# Patient Record
Sex: MALE | Race: WHITE | HISPANIC OR LATINO | Employment: OTHER | URBAN - METROPOLITAN AREA
[De-identification: names, ages, dates, MRNs, and addresses within clinical notes are randomized per-mention and may not be internally consistent; named-entity substitution may affect disease eponyms.]

---

## 2023-03-31 DIAGNOSIS — M51.36 DISCOGENIC LOW BACK PAIN: ICD-10-CM

## 2023-03-31 DIAGNOSIS — G89.29 OTHER CHRONIC PAIN: ICD-10-CM

## 2023-03-31 PROBLEM — R10.13 EPIGASTRIC PAIN: Status: ACTIVE | Noted: 2023-03-31

## 2023-03-31 PROBLEM — K57.32 DIVERTICULITIS OF COLON: Status: ACTIVE | Noted: 2023-03-31

## 2023-03-31 PROBLEM — M51.37 OTHER INTERVERTEBRAL DISC DEGENERATION, LUMBOSACRAL REGION: Status: ACTIVE | Noted: 2023-03-31

## 2023-03-31 PROBLEM — M47.816 LUMBAR SPONDYLOSIS: Status: ACTIVE | Noted: 2023-03-31

## 2023-03-31 PROBLEM — M51.379 OTHER INTERVERTEBRAL DISC DEGENERATION, LUMBOSACRAL REGION: Status: ACTIVE | Noted: 2023-03-31

## 2023-03-31 PROBLEM — E87.6 HYPOKALEMIA: Status: ACTIVE | Noted: 2023-03-31

## 2023-03-31 PROBLEM — R10.10 PAIN OF UPPER ABDOMEN: Status: ACTIVE | Noted: 2023-03-31

## 2023-03-31 PROBLEM — K21.9 ESOPHAGEAL REFLUX: Status: ACTIVE | Noted: 2023-03-31

## 2023-03-31 PROBLEM — E51.9 THIAMINE DEFICIENCY: Status: ACTIVE | Noted: 2023-03-31

## 2023-03-31 PROBLEM — M19.90 ARTHRITIS: Status: ACTIVE | Noted: 2023-03-31

## 2023-03-31 PROBLEM — E66.811 OBESITY (BMI 30.0-34.9): Status: ACTIVE | Noted: 2023-03-31

## 2023-03-31 PROBLEM — E66.9 OBESITY (BMI 30.0-34.9): Status: ACTIVE | Noted: 2023-03-31

## 2023-03-31 PROBLEM — G47.30 SLEEP APNEA: Status: ACTIVE | Noted: 2023-03-31

## 2023-03-31 PROBLEM — R03.0 ELEVATED BP WITHOUT DIAGNOSIS OF HYPERTENSION: Status: ACTIVE | Noted: 2023-03-31

## 2023-03-31 PROBLEM — M47.816 FACET DEGENERATION OF LUMBAR REGION: Status: ACTIVE | Noted: 2023-03-31

## 2023-03-31 PROBLEM — M51.369 LUMBAR DEGENERATIVE DISC DISEASE: Status: ACTIVE | Noted: 2023-03-31

## 2023-03-31 PROBLEM — Z98.84 S/P GASTRIC BYPASS: Status: ACTIVE | Noted: 2023-03-31

## 2023-03-31 PROBLEM — R73.09 ELEVATED GLUCOSE: Status: ACTIVE | Noted: 2023-03-31

## 2023-03-31 PROBLEM — E83.19 IRON EXCESS: Status: ACTIVE | Noted: 2023-03-31

## 2023-03-31 PROBLEM — R10.11 CHRONIC RIGHT UPPER QUADRANT PAIN: Status: ACTIVE | Noted: 2023-03-31

## 2023-03-31 PROBLEM — M51.360 DISCOGENIC LOW BACK PAIN: Status: ACTIVE | Noted: 2023-03-31

## 2023-03-31 PROBLEM — E88.810 DYSMETABOLIC SYNDROME: Status: ACTIVE | Noted: 2023-03-31

## 2023-03-31 RX ORDER — CYCLOBENZAPRINE HYDROCHLORIDE 7.5 MG/1
7.5 TABLET, FILM COATED ORAL 3 TIMES DAILY PRN
COMMUNITY
End: 2023-10-26 | Stop reason: SDUPTHER

## 2023-03-31 RX ORDER — OMEPRAZOLE 40 MG/1
40 CAPSULE, DELAYED RELEASE ORAL
COMMUNITY
Start: 2022-03-31 | End: 2024-05-31 | Stop reason: DRUGHIGH

## 2023-03-31 RX ORDER — ONDANSETRON 4 MG/1
4 TABLET, ORALLY DISINTEGRATING ORAL EVERY 8 HOURS PRN
COMMUNITY
Start: 2022-05-04 | End: 2023-04-28 | Stop reason: ALTCHOICE

## 2023-03-31 RX ORDER — OXYCODONE AND ACETAMINOPHEN 5; 325 MG/1; MG/1
1-2 TABLET ORAL EVERY 6 HOURS PRN
COMMUNITY
Start: 2020-06-01 | End: 2023-03-31 | Stop reason: SDUPTHER

## 2023-03-31 RX ORDER — METAXALONE 800 MG/1
1 TABLET ORAL 2 TIMES DAILY
COMMUNITY
Start: 2014-11-03 | End: 2023-10-26 | Stop reason: SDUPTHER

## 2023-03-31 RX ORDER — NALOXONE HYDROCHLORIDE 4 MG/.1ML
4 SPRAY NASAL AS NEEDED
COMMUNITY
Start: 2019-07-15 | End: 2024-01-26 | Stop reason: SDUPTHER

## 2023-03-31 RX ORDER — OXYCODONE AND ACETAMINOPHEN 5; 325 MG/1; MG/1
1-2 TABLET ORAL EVERY 6 HOURS PRN
Qty: 40 TABLET | Refills: 0 | Status: SHIPPED | OUTPATIENT
Start: 2023-03-31 | End: 2023-04-28 | Stop reason: SDUPTHER

## 2023-04-28 ENCOUNTER — OFFICE VISIT (OUTPATIENT)
Dept: PRIMARY CARE | Facility: CLINIC | Age: 48
End: 2023-04-28
Payer: COMMERCIAL

## 2023-04-28 VITALS
OXYGEN SATURATION: 98 % | TEMPERATURE: 97.7 F | WEIGHT: 242.8 LBS | DIASTOLIC BLOOD PRESSURE: 88 MMHG | BODY MASS INDEX: 31.19 KG/M2 | HEART RATE: 88 BPM | SYSTOLIC BLOOD PRESSURE: 136 MMHG

## 2023-04-28 DIAGNOSIS — K21.00 GASTROESOPHAGEAL REFLUX DISEASE WITH ESOPHAGITIS WITHOUT HEMORRHAGE: ICD-10-CM

## 2023-04-28 DIAGNOSIS — G89.29 OTHER CHRONIC PAIN: ICD-10-CM

## 2023-04-28 DIAGNOSIS — M47.816 FACET DEGENERATION OF LUMBAR REGION: ICD-10-CM

## 2023-04-28 DIAGNOSIS — M19.90 ARTHRITIS: ICD-10-CM

## 2023-04-28 DIAGNOSIS — I10 PRIMARY HYPERTENSION: ICD-10-CM

## 2023-04-28 DIAGNOSIS — R10.13 EPIGASTRIC PAIN: ICD-10-CM

## 2023-04-28 DIAGNOSIS — M51.36 DISCOGENIC LOW BACK PAIN: Primary | ICD-10-CM

## 2023-04-28 PROBLEM — H69.90 EUSTACHIAN TUBE DYSFUNCTION: Status: ACTIVE | Noted: 2023-04-28

## 2023-04-28 PROBLEM — M10.9 GOUT, ARTHROPATHY: Status: ACTIVE | Noted: 2023-04-28

## 2023-04-28 PROBLEM — R60.9 EDEMA: Status: ACTIVE | Noted: 2023-04-28

## 2023-04-28 PROBLEM — E55.9 VITAMIN D DEFICIENCY: Status: ACTIVE | Noted: 2023-04-28

## 2023-04-28 PROBLEM — L40.9 PSORIASIS: Status: ACTIVE | Noted: 2023-04-28

## 2023-04-28 PROBLEM — D17.9 LIPOMA: Status: ACTIVE | Noted: 2023-04-28

## 2023-04-28 PROCEDURE — 3008F BODY MASS INDEX DOCD: CPT | Performed by: FAMILY MEDICINE

## 2023-04-28 PROCEDURE — 99214 OFFICE O/P EST MOD 30 MIN: CPT | Performed by: FAMILY MEDICINE

## 2023-04-28 PROCEDURE — 1036F TOBACCO NON-USER: CPT | Performed by: FAMILY MEDICINE

## 2023-04-28 PROCEDURE — 3079F DIAST BP 80-89 MM HG: CPT | Performed by: FAMILY MEDICINE

## 2023-04-28 PROCEDURE — 3075F SYST BP GE 130 - 139MM HG: CPT | Performed by: FAMILY MEDICINE

## 2023-04-28 RX ORDER — ESOMEPRAZOLE MAGNESIUM 40 MG/1
80 CAPSULE, DELAYED RELEASE ORAL
COMMUNITY
End: 2023-04-28

## 2023-04-28 RX ORDER — CHLORTHALIDONE 25 MG/1
25 TABLET ORAL
COMMUNITY
Start: 2015-09-30 | End: 2023-04-28 | Stop reason: ALTCHOICE

## 2023-04-28 RX ORDER — LISINOPRIL 20 MG/1
20 TABLET ORAL
Qty: 90 TABLET | Refills: 1 | Status: SHIPPED | OUTPATIENT
Start: 2023-04-28 | End: 2023-07-28 | Stop reason: ALTCHOICE

## 2023-04-28 RX ORDER — FAMOTIDINE 40 MG/1
40 TABLET, FILM COATED ORAL 2 TIMES DAILY
Qty: 180 TABLET | Refills: 3 | Status: SHIPPED | OUTPATIENT
Start: 2023-04-28 | End: 2023-10-26 | Stop reason: SDUPTHER

## 2023-04-28 RX ORDER — OXYCODONE AND ACETAMINOPHEN 5; 325 MG/1; MG/1
1-2 TABLET ORAL EVERY 6 HOURS PRN
Qty: 40 TABLET | Refills: 0 | Status: SHIPPED | OUTPATIENT
Start: 2023-04-28 | End: 2023-06-01 | Stop reason: SDUPTHER

## 2023-04-28 RX ORDER — LISINOPRIL 20 MG/1
20 TABLET ORAL
COMMUNITY
End: 2023-04-28 | Stop reason: SDUPTHER

## 2023-04-28 RX ORDER — ALLOPURINOL 100 MG/1
400 TABLET ORAL DAILY
COMMUNITY
Start: 2023-04-06 | End: 2023-07-28 | Stop reason: SDUPTHER

## 2023-04-28 RX ORDER — PANTOPRAZOLE SODIUM 40 MG/1
40 FOR SUSPENSION ORAL
COMMUNITY
Start: 2021-08-03 | End: 2023-04-28 | Stop reason: ALTCHOICE

## 2023-04-28 ASSESSMENT — LIFESTYLE VARIABLES: TOTAL SCORE: 1

## 2023-04-28 NOTE — ASSESSMENT & PLAN NOTE
Pain stable on current regimen  Refilling medications at current dosage - encouraging continued diet and exercise modifications.  The OARRS website was checked and validated.  I have personally reviewed the OARRS report for this patient.  This report has been scanned into the electronic medical record.  I have considered the risks of abuse,, dependence, addiction and diversion.  I believe that it is clinically appropriate for this patient to be prescribed this medication.  The patient has been told not to increase dose or refill sooner than indicated.  Any variation from this practice will results in my denying further prescriptions and possible discharge from care.

## 2023-04-28 NOTE — PROGRESS NOTES
Subjective   Patient ID: Lito Unger is a 47 y.o. adult who presents for Med Refill.  HPI    LIPOMA EXCISIONS: back excisions with Dr. Garcia  Had hematoma on one that needed to be drained  Still some swelling    CHRONIC LOW BACK PAIN:  IN REVIEW:   Has had chronic low back pain for many years  Had been to orthopedics and multiple rounds of PT as well as chiropractic care    MRI 2015 - herniated disc at L5-S1  Has had multiple courses of formal PT  Did see Dr. Radha Young for pain management options     Did have injections with Dr. 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 I have considered the risks of abuse, dependence, addiction and diversion    Is the patient prescribed a combination of a benzodiazepine and opioid?  No    Last Urine Drug Screen / ordered today: Yes  Recent Results (from the past 48250 hour(s))   OPIATE/OPIOID/BENZO PRESCRIPTION COMPLIANCE    Collection Time: 01/30/23 10:23 AM   Result Value Ref Range    DRUG SCREEN COMMENT URINE SEE BELOW     Creatine, Urine 20.7 mg/dL    Amphetamine Screen, Urine PRESUMPTIVE NEGATIVE NEGATIVE    Barbiturate Screen, Urine PRESUMPTIVE NEGATIVE NEGATIVE    Cannabinoid Screen, Urine PRESUMPTIVE NEGATIVE NEGATIVE    Cocaine Screen, Urine PRESUMPTIVE NEGATIVE NEGATIVE    PCP Screen, Urine PRESUMPTIVE NEGATIVE NEGATIVE    7-Aminoclonazepam <25 Cutoff <25 ng/mL    Alpha-Hydroxyalprazolam <25 Cutoff <25 ng/mL    Alpha-Hydroxymidazolam <25 Cutoff <25 ng/mL    Alprazolam <25 Cutoff <25 ng/mL    Chlordiazepoxide <25  Cutoff <25 ng/mL    Clonazepam <25 Cutoff <25 ng/mL    Diazepam <25 Cutoff <25 ng/mL    Lorazepam <25 Cutoff <25 ng/mL    Midazolam <25 Cutoff <25 ng/mL    Nordiazepam <25 Cutoff <25 ng/mL    Oxazepam <25 Cutoff <25 ng/mL    Temazepam <25 Cutoff <25 ng/mL    Zolpidem <25 Cutoff <25 ng/mL    Zolpidem Metabolite (ZCA) <25 Cutoff <25 ng/mL    6-Acetylmorphine <25 Cutoff <25 ng/mL    Codeine <50 Cutoff <50 ng/mL    Hydrocodone <25 Cutoff <25 ng/mL    Hydromorphone <25 Cutoff <25 ng/mL    Morphine Urine <50 Cutoff <50 ng/mL    Norhydrocodone <25 Cutoff <25 ng/mL    Noroxycodone 170 (A) Cutoff <25 ng/mL    Oxycodone 203 (A) Cutoff <25 ng/mL    Oxymorphone 341 (A) Cutoff <25 ng/mL    Tramadol <50 Cutoff <50 ng/mL    O-Desmethyltramadol <50 Cutoff <50 ng/mL    Fentanyl <2.5 Cutoff<2.5 ng/mL    Norfentanyl <2.5 Cutoff<2.5 ng/mL    METHADONE CONFIRMATION,URINE <25 Cutoff <25 ng/mL    EDDP <25 Cutoff <25 ng/mL     Results are as expected.     Controlled Substance Agreement:  Date of the Last Agreement: 1/30/2023  Reviewed Controlled Substance Agreement including but not limited to the benefits, risks, and alternatives to treatment with a Controlled Substance medication(s).    Opioids:  What is the patient's goal of therapy? MAINTAIN TOLERABLE PAIN LEVELS TO CARRY OUT DAILY ACTIVITIES  Is this being achieved with current treatment? YES    I have calculated the patient's Morphine Dose Equivalent (MED):   I have considered referral to Pain Management and/or a specialist, and do not feel it is necessary at this time.    I feel that it is clinically indicated to continue this current medication regimen after consideration of alternative therapies, and other non-opioid treatment.    Opioid Risk Screening:  Family History of Substance Abuse  Alcohol: 0 (4/28/2023 10:00 AM)  Illegal Drugs: 0 (4/28/2023 10:00 AM)  Prescription Drugs: 0 (4/28/2023 10:00 AM)    Personal History of Substance Abuse  Alcohol: 0 (4/28/2023 10:00 AM)  Illegal  Drugs: 0 (4/28/2023 10:00 AM)  Prescription Drugs: 0 (4/28/2023 10:00 AM)    Patient Age (16-45)  Age (16-45): 1 (4/28/2023 10:00 AM)    History of Preadolescent Sexual Abuse  History of Preadolescent Sexual Abuse: .0 (4/28/2023 10:00 AM)    Psychological Disease  Attention Deficit Disorder, Obsessive Compulsive Disorder, Bipolar, Schizophrenia: 0 (4/28/2023 10:00 AM)  Depression: 0 (4/28/2023 10:00 AM)    Total Score  Total Score: 1 (4/28/2023 10:00 AM)    Total Score Risk Category  TOTAL SCORE CATEGORY: Low Risk (0-3) (4/28/2023 10:00 AM)        Pain Assessment:  Analgesia  What was your pain level on average during the past week?: 6  What was your pain level at its worst during the past week?: 8  What percentage of your pain has been relieved during the past week?: 70 %  Is the amount of pain relief you are now obtaining from your current pain relievers enough to make a real difference in your life?: Y  Query to Clinician: Is the patient's pain relief clinically significant?: Yes    Activities of Daily Living  Physical Functioning: Same  Family Relationships: Same  Social Relationships: Same  Mood: Same  Sleep Patterns: Same  Overall Functioning: Same    Adverse Events  Is patient experiencing any side effects from current pain relievers?: N  Patient's Overall Severity of Side Effects: None      Assessment  Is your overall impression that this patient is benefiting from opioid therapy?: Yes  Specific Analgesic Plan: Continue present regimen        Review of Systems  Review of Systems negative except as noted in HPI and Chief complaint.     Objective               Physical Exam  Constitutional:       General: Lito Unger is not in acute distress.     Appearance: Normal appearance. Lito Unger is not ill-appearing.   HENT:      Head: Normocephalic and atraumatic.   Neck:      Vascular: No carotid bruit.   Cardiovascular:      Rate and Rhythm: Normal rate and regular rhythm.      Pulses: Normal pulses.       Heart sounds: Normal heart sounds. No murmur heard.     No gallop.   Pulmonary:      Effort: Pulmonary effort is normal.      Breath sounds: Normal breath sounds. No wheezing, rhonchi or rales.   Musculoskeletal:      Cervical back: Normal range of motion and neck supple. No rigidity or tenderness.   Lymphadenopathy:      Cervical: No cervical adenopathy.   Skin:     General: Skin is warm and dry.   Neurological:      Mental Status: Lito Unger is alert.   Psychiatric:         Mood and Affect: Mood normal.         Behavior: Behavior normal.       /88 (BP Location: Right arm, Patient Position: Sitting, BP Cuff Size: Adult)   Pulse 88   Temp 36.5 °C (97.7 °F)   Wt 110 kg (242 lb 12.8 oz)   SpO2 98%   BMI 31.19 kg/m²      Assessment/Plan   Problem List Items Addressed This Visit          Nervous    Chronic pain    Relevant Medications    oxyCODONE-acetaminophen (Percocet) 5-325 mg tablet    Epigastric pain       Circulatory    HTN (hypertension)    Relevant Medications    lisinopril 20 mg tablet       Digestive    Esophageal reflux    Relevant Medications    famotidine (Pepcid) 40 mg tablet       Musculoskeletal    Arthritis    Facet degeneration of lumbar region    Relevant Medications    oxyCODONE-acetaminophen (Percocet) 5-325 mg tablet    Discogenic low back pain - Primary     Pain stable on current regimen  Refilling medications at current dosage - encouraging continued diet and exercise modifications.  The OARRS website was checked and validated.  I have personally reviewed the OARRS report for this patient.  This report has been scanned into the electronic medical record.  I have considered the risks of abuse,, dependence, addiction and diversion.  I believe that it is clinically appropriate for this patient to be prescribed this medication.  The patient has been told not to increase dose or refill sooner than indicated.  Any variation from this practice will results in my denying further  prescriptions and possible discharge from care.          Relevant Medications    oxyCODONE-acetaminophen (Percocet) 5-325 mg tablet

## 2023-06-01 DIAGNOSIS — M51.36 DISCOGENIC LOW BACK PAIN: ICD-10-CM

## 2023-06-01 DIAGNOSIS — M47.816 FACET DEGENERATION OF LUMBAR REGION: ICD-10-CM

## 2023-06-01 DIAGNOSIS — G89.29 OTHER CHRONIC PAIN: ICD-10-CM

## 2023-06-01 RX ORDER — OXYCODONE AND ACETAMINOPHEN 5; 325 MG/1; MG/1
1-2 TABLET ORAL EVERY 6 HOURS PRN
Qty: 40 TABLET | Refills: 0 | Status: SHIPPED | OUTPATIENT
Start: 2023-06-01 | End: 2023-06-29 | Stop reason: SDUPTHER

## 2023-06-29 DIAGNOSIS — M51.36 DISCOGENIC LOW BACK PAIN: ICD-10-CM

## 2023-06-29 DIAGNOSIS — G89.29 OTHER CHRONIC PAIN: ICD-10-CM

## 2023-06-29 DIAGNOSIS — M47.816 FACET DEGENERATION OF LUMBAR REGION: ICD-10-CM

## 2023-06-29 RX ORDER — OXYCODONE AND ACETAMINOPHEN 5; 325 MG/1; MG/1
1-2 TABLET ORAL EVERY 6 HOURS PRN
Qty: 40 TABLET | Refills: 0 | Status: SHIPPED | OUTPATIENT
Start: 2023-06-29 | End: 2023-07-28 | Stop reason: SDUPTHER

## 2023-07-28 ENCOUNTER — OFFICE VISIT (OUTPATIENT)
Dept: PRIMARY CARE | Facility: CLINIC | Age: 48
End: 2023-07-28
Payer: COMMERCIAL

## 2023-07-28 VITALS
HEIGHT: 73 IN | DIASTOLIC BLOOD PRESSURE: 78 MMHG | BODY MASS INDEX: 31.28 KG/M2 | WEIGHT: 236 LBS | OXYGEN SATURATION: 97 % | SYSTOLIC BLOOD PRESSURE: 130 MMHG | HEART RATE: 78 BPM

## 2023-07-28 DIAGNOSIS — E66.09 CLASS 1 OBESITY DUE TO EXCESS CALORIES WITH SERIOUS COMORBIDITY AND BODY MASS INDEX (BMI) OF 31.0 TO 31.9 IN ADULT: ICD-10-CM

## 2023-07-28 DIAGNOSIS — K21.9 GASTROESOPHAGEAL REFLUX DISEASE WITHOUT ESOPHAGITIS: ICD-10-CM

## 2023-07-28 DIAGNOSIS — G89.29 OTHER CHRONIC PAIN: Primary | ICD-10-CM

## 2023-07-28 DIAGNOSIS — M51.36 DISCOGENIC LOW BACK PAIN: ICD-10-CM

## 2023-07-28 DIAGNOSIS — M10.9 GOUT, UNSPECIFIED CAUSE, UNSPECIFIED CHRONICITY, UNSPECIFIED SITE: ICD-10-CM

## 2023-07-28 DIAGNOSIS — M47.816 FACET DEGENERATION OF LUMBAR REGION: ICD-10-CM

## 2023-07-28 PROCEDURE — 1036F TOBACCO NON-USER: CPT | Performed by: FAMILY MEDICINE

## 2023-07-28 PROCEDURE — 99213 OFFICE O/P EST LOW 20 MIN: CPT | Performed by: FAMILY MEDICINE

## 2023-07-28 PROCEDURE — 3078F DIAST BP <80 MM HG: CPT | Performed by: FAMILY MEDICINE

## 2023-07-28 PROCEDURE — 3008F BODY MASS INDEX DOCD: CPT | Performed by: FAMILY MEDICINE

## 2023-07-28 PROCEDURE — 3075F SYST BP GE 130 - 139MM HG: CPT | Performed by: FAMILY MEDICINE

## 2023-07-28 RX ORDER — OXYCODONE AND ACETAMINOPHEN 5; 325 MG/1; MG/1
1-2 TABLET ORAL EVERY 6 HOURS PRN
Qty: 40 TABLET | Refills: 0 | Status: SHIPPED | OUTPATIENT
Start: 2023-07-28 | End: 2023-08-28 | Stop reason: SDUPTHER

## 2023-07-28 RX ORDER — ALLOPURINOL 100 MG/1
400 TABLET ORAL DAILY
Qty: 360 TABLET | Refills: 3 | Status: SHIPPED | OUTPATIENT
Start: 2023-07-28 | End: 2023-10-26 | Stop reason: SDUPTHER

## 2023-07-28 ASSESSMENT — LIFESTYLE VARIABLES: TOTAL SCORE: 1

## 2023-07-28 NOTE — PROGRESS NOTES
Subjective   Patient ID: Lito Unger is a 47 y.o. adult who presents for Med Refill.  HPI    OBESITY: weight stable  Still struggles with protein intake -  meat makes him ill still  Using Protein shakes, cheese yogurt  Not able to work out as much recently with back pain worsening    CHRONIC LOW BACK PAIN: taking Percocet 1-2 times daily  Usually taking at night as pain wakes him at night  Wakes 2-3 times per week - usually after increased activity    Still sees chiropractor    Tried Lidocaine patches in the past but had allergic reaction to patch  Tried OTC Aspercreme as compounding pharmacy was too expensive    OARRS:  Jeannie Sanders, DO on 7/28/2023 10:33 AM  I have personally reviewed the OARRS report for Lito Unger. I have considered the risks of abuse, dependence, addiction and diversion    Is the patient prescribed a combination of a benzodiazepine and opioid?  No    Last Urine Drug Screen / ordered today: Yes  Recent Results (from the past 81624 hour(s))   OPIATE/OPIOID/BENZO PRESCRIPTION COMPLIANCE    Collection Time: 01/30/23 10:23 AM   Result Value Ref Range    DRUG SCREEN COMMENT URINE SEE BELOW     Creatine, Urine 20.7 mg/dL    Amphetamine Screen, Urine PRESUMPTIVE NEGATIVE NEGATIVE    Barbiturate Screen, Urine PRESUMPTIVE NEGATIVE NEGATIVE    Cannabinoid Screen, Urine PRESUMPTIVE NEGATIVE NEGATIVE    Cocaine Screen, Urine PRESUMPTIVE NEGATIVE NEGATIVE    PCP Screen, Urine PRESUMPTIVE NEGATIVE NEGATIVE    7-Aminoclonazepam <25 Cutoff <25 ng/mL    Alpha-Hydroxyalprazolam <25 Cutoff <25 ng/mL    Alpha-Hydroxymidazolam <25 Cutoff <25 ng/mL    Alprazolam <25 Cutoff <25 ng/mL    Chlordiazepoxide <25 Cutoff <25 ng/mL    Clonazepam <25 Cutoff <25 ng/mL    Diazepam <25 Cutoff <25 ng/mL    Lorazepam <25 Cutoff <25 ng/mL    Midazolam <25 Cutoff <25 ng/mL    Nordiazepam <25 Cutoff <25 ng/mL    Oxazepam <25 Cutoff <25 ng/mL    Temazepam <25 Cutoff <25 ng/mL    Zolpidem <25 Cutoff <25 ng/mL    Zolpidem  Metabolite (ZCA) <25 Cutoff <25 ng/mL    6-Acetylmorphine <25 Cutoff <25 ng/mL    Codeine <50 Cutoff <50 ng/mL    Hydrocodone <25 Cutoff <25 ng/mL    Hydromorphone <25 Cutoff <25 ng/mL    Morphine Urine <50 Cutoff <50 ng/mL    Norhydrocodone <25 Cutoff <25 ng/mL    Noroxycodone 170 (A) Cutoff <25 ng/mL    Oxycodone 203 (A) Cutoff <25 ng/mL    Oxymorphone 341 (A) Cutoff <25 ng/mL    Tramadol <50 Cutoff <50 ng/mL    O-Desmethyltramadol <50 Cutoff <50 ng/mL    Fentanyl <2.5 Cutoff<2.5 ng/mL    Norfentanyl <2.5 Cutoff<2.5 ng/mL    METHADONE CONFIRMATION,URINE <25 Cutoff <25 ng/mL    EDDP <25 Cutoff <25 ng/mL     Results are as expected.     Controlled Substance Agreement:  Date of the Last Agreement: 1/30/2023  Reviewed Controlled Substance Agreement including but not limited to the benefits, risks, and alternatives to treatment with a Controlled Substance medication(s).    Opioids:  What is the patient's goal of therapy? MAINTAIN TOLERABLE  PAIN LEVELS TO CARRY OUT DAILY ACTIVITY  Is this being achieved with current treatment? YES    I have calculated the patient's Morphine Dose Equivalent (MED):   I have considered referral to Pain Management and/or a specialist, and do not feel it is necessary at this time.    I feel that it is clinically indicated to continue this current medication regimen after consideration of alternative therapies, and other non-opioid treatment.    Opioid Risk Screening:  Family History of Substance Abuse  Alcohol: 0 (7/28/2023 10:00 AM)  Illegal Drugs: 0 (7/28/2023 10:00 AM)  Prescription Drugs: 0 (7/28/2023 10:00 AM)    Personal History of Substance Abuse  Alcohol: 0 (7/28/2023 10:00 AM)  Illegal Drugs: 0 (7/28/2023 10:00 AM)  Prescription Drugs: 0 (7/28/2023 10:00 AM)    Patient Age (16-45)  Age (16-45): 1 (7/28/2023 10:00 AM)    History of Preadolescent Sexual Abuse  History of Preadolescent Sexual Abuse: .0 (7/28/2023 10:00 AM)    Psychological Disease  Attention Deficit Disorder,  Obsessive Compulsive Disorder, Bipolar, Schizophrenia: 0 (7/28/2023 10:00 AM)  Depression: 0 (7/28/2023 10:00 AM)    Total Score  Total Score: 1 (7/28/2023 10:00 AM)    Total Score Risk Category  TOTAL SCORE CATEGORY: Low Risk (0-3) (7/28/2023 10:00 AM)        Pain Assessment:  Analgesia  What was your pain level on average during the past week?: 5  What was your pain level at its worst during the past week?: 7  What percentage of your pain has been relieved during the past week?: 60 %  Is the amount of pain relief you are now obtaining from your current pain relievers enough to make a real difference in your life?: Y  Query to Clinician: Is the patient's pain relief clinically significant?: Yes    Activities of Daily Living  Physical Functioning: Same  Family Relationships: Same  Social Relationships: Same  Mood: Same  Sleep Patterns: Worse  Overall Functioning: Same    Adverse Events  Is patient experiencing any side effects from current pain relievers?: N  Patient's Overall Severity of Side Effects: None      Assessment  Is your overall impression that this patient is benefiting from opioid therapy?: Yes  Specific Analgesic Plan: Continue present regimen        Review of Systems    Review of Systems negative except as noted in HPI and Chief complaint.     Objective               Physical Exam  Constitutional:       General: Lito Unger is not in acute distress.     Appearance: Normal appearance. Lito Unger is not ill-appearing.   HENT:      Head: Normocephalic and atraumatic.   Neck:      Vascular: No carotid bruit.   Cardiovascular:      Rate and Rhythm: Normal rate and regular rhythm.      Pulses: Normal pulses.      Heart sounds: Normal heart sounds. No murmur heard.     No gallop.   Pulmonary:      Effort: Pulmonary effort is normal.      Breath sounds: Normal breath sounds. No wheezing, rhonchi or rales.   Musculoskeletal:      Cervical back: Normal range of motion and neck supple. No rigidity or  "tenderness.   Lymphadenopathy:      Cervical: No cervical adenopathy.   Skin:     General: Skin is warm and dry.   Neurological:      Mental Status: Lito Unger is alert.   Psychiatric:         Mood and Affect: Mood normal.         Behavior: Behavior normal.       /78 (BP Location: Right arm, Patient Position: Sitting, BP Cuff Size: Large adult)   Pulse 78   Ht 1.854 m (6' 1\")   Wt 107 kg (236 lb)   SpO2 97%   BMI 31.14 kg/m²      Assessment/Plan   Problem List Items Addressed This Visit       Chronic pain - Primary    Relevant Medications    oxyCODONE-acetaminophen (Percocet) 5-325 mg tablet    Esophageal reflux    Facet degeneration of lumbar region    Relevant Medications    oxyCODONE-acetaminophen (Percocet) 5-325 mg tablet    Discogenic low back pain    Relevant Medications    oxyCODONE-acetaminophen (Percocet) 5-325 mg tablet     Other Visit Diagnoses       Class 1 obesity due to excess calories with serious comorbidity and body mass index (BMI) of 31.0 to 31.9 in adult        Gout, unspecified cause, unspecified chronicity, unspecified site        Relevant Medications    allopurinol (Zyloprim) 100 mg tablet        The patient received Provided instructions on dietary changes  Provided instructions on exercise  Advised to Increase physical activity because they have an above normal BMI.       FOLLOW UP 3 MONTHS.     "

## 2023-08-28 DIAGNOSIS — G89.29 OTHER CHRONIC PAIN: ICD-10-CM

## 2023-08-28 DIAGNOSIS — M47.816 FACET DEGENERATION OF LUMBAR REGION: ICD-10-CM

## 2023-08-28 DIAGNOSIS — M51.36 DISCOGENIC LOW BACK PAIN: ICD-10-CM

## 2023-08-28 RX ORDER — OXYCODONE AND ACETAMINOPHEN 5; 325 MG/1; MG/1
1-2 TABLET ORAL EVERY 6 HOURS PRN
Qty: 40 TABLET | Refills: 0 | Status: SHIPPED | OUTPATIENT
Start: 2023-08-28 | End: 2023-09-27 | Stop reason: SDUPTHER

## 2023-09-27 DIAGNOSIS — M47.816 FACET DEGENERATION OF LUMBAR REGION: ICD-10-CM

## 2023-09-27 DIAGNOSIS — M51.36 DISCOGENIC LOW BACK PAIN: ICD-10-CM

## 2023-09-27 DIAGNOSIS — G89.29 OTHER CHRONIC PAIN: ICD-10-CM

## 2023-09-27 NOTE — TELEPHONE ENCOUNTER
Patient needs refill of Percocet 5/325 sent to [facility] on [address] Lolita SWAIN 10/26/2023.

## 2023-09-28 RX ORDER — OXYCODONE AND ACETAMINOPHEN 5; 325 MG/1; MG/1
1-2 TABLET ORAL EVERY 6 HOURS PRN
Qty: 40 TABLET | Refills: 0 | Status: SHIPPED | OUTPATIENT
Start: 2023-09-28 | End: 2023-10-26 | Stop reason: SDUPTHER

## 2023-10-26 ENCOUNTER — OFFICE VISIT (OUTPATIENT)
Dept: PRIMARY CARE | Facility: CLINIC | Age: 48
End: 2023-10-26
Payer: COMMERCIAL

## 2023-10-26 VITALS
OXYGEN SATURATION: 98 % | BODY MASS INDEX: 30.48 KG/M2 | HEIGHT: 73 IN | SYSTOLIC BLOOD PRESSURE: 138 MMHG | WEIGHT: 230 LBS | DIASTOLIC BLOOD PRESSURE: 86 MMHG | HEART RATE: 78 BPM

## 2023-10-26 DIAGNOSIS — K21.00 GASTROESOPHAGEAL REFLUX DISEASE WITH ESOPHAGITIS WITHOUT HEMORRHAGE: ICD-10-CM

## 2023-10-26 DIAGNOSIS — M51.36 DISCOGENIC LOW BACK PAIN: ICD-10-CM

## 2023-10-26 DIAGNOSIS — M51.36 LUMBAR DEGENERATIVE DISC DISEASE: Primary | ICD-10-CM

## 2023-10-26 DIAGNOSIS — M47.816 FACET DEGENERATION OF LUMBAR REGION: ICD-10-CM

## 2023-10-26 DIAGNOSIS — M10.9 GOUT, ARTHROPATHY: ICD-10-CM

## 2023-10-26 DIAGNOSIS — M10.9 GOUT, UNSPECIFIED CAUSE, UNSPECIFIED CHRONICITY, UNSPECIFIED SITE: ICD-10-CM

## 2023-10-26 DIAGNOSIS — E66.01 MORBID OBESITY (MULTI): ICD-10-CM

## 2023-10-26 DIAGNOSIS — G89.29 OTHER CHRONIC PAIN: ICD-10-CM

## 2023-10-26 PROBLEM — E66.811 OBESITY (BMI 30.0-34.9): Status: RESOLVED | Noted: 2023-03-31 | Resolved: 2023-10-26

## 2023-10-26 PROBLEM — E66.9 OBESITY (BMI 30.0-34.9): Status: RESOLVED | Noted: 2023-03-31 | Resolved: 2023-10-26

## 2023-10-26 PROCEDURE — 99214 OFFICE O/P EST MOD 30 MIN: CPT | Performed by: FAMILY MEDICINE

## 2023-10-26 PROCEDURE — 1036F TOBACCO NON-USER: CPT | Performed by: FAMILY MEDICINE

## 2023-10-26 PROCEDURE — 3075F SYST BP GE 130 - 139MM HG: CPT | Performed by: FAMILY MEDICINE

## 2023-10-26 PROCEDURE — 3079F DIAST BP 80-89 MM HG: CPT | Performed by: FAMILY MEDICINE

## 2023-10-26 PROCEDURE — 3008F BODY MASS INDEX DOCD: CPT | Performed by: FAMILY MEDICINE

## 2023-10-26 RX ORDER — OXYCODONE AND ACETAMINOPHEN 5; 325 MG/1; MG/1
1-2 TABLET ORAL EVERY 6 HOURS PRN
Qty: 40 TABLET | Refills: 0 | Status: SHIPPED | OUTPATIENT
Start: 2023-10-26 | End: 2023-11-28 | Stop reason: SDUPTHER

## 2023-10-26 RX ORDER — FAMOTIDINE 40 MG/1
40 TABLET, FILM COATED ORAL 2 TIMES DAILY
Qty: 180 TABLET | Refills: 3 | Status: SHIPPED | OUTPATIENT
Start: 2023-10-26 | End: 2024-04-25 | Stop reason: SDUPTHER

## 2023-10-26 RX ORDER — CYCLOBENZAPRINE HYDROCHLORIDE 7.5 MG/1
7.5 TABLET, FILM COATED ORAL 3 TIMES DAILY PRN
Qty: 60 TABLET | Refills: 2 | Status: SHIPPED | OUTPATIENT
Start: 2023-10-26 | End: 2024-01-26 | Stop reason: SDUPTHER

## 2023-10-26 RX ORDER — ALLOPURINOL 100 MG/1
400 TABLET ORAL DAILY
Qty: 360 TABLET | Refills: 3 | Status: SHIPPED | OUTPATIENT
Start: 2023-10-26

## 2023-10-26 RX ORDER — METAXALONE 800 MG/1
800 TABLET ORAL 2 TIMES DAILY
Qty: 60 TABLET | Refills: 3 | Status: SHIPPED | OUTPATIENT
Start: 2023-10-26 | End: 2024-05-31 | Stop reason: SDUPTHER

## 2023-10-26 ASSESSMENT — LIFESTYLE VARIABLES
AUDIT-C TOTAL SCORE: 0
HOW OFTEN DO YOU HAVE SIX OR MORE DRINKS ON ONE OCCASION: NEVER
SKIP TO QUESTIONS 9-10: 1
TOTAL SCORE: 1
HOW MANY STANDARD DRINKS CONTAINING ALCOHOL DO YOU HAVE ON A TYPICAL DAY: PATIENT DOES NOT DRINK
HOW OFTEN DO YOU HAVE A DRINK CONTAINING ALCOHOL: NEVER

## 2023-10-26 NOTE — PROGRESS NOTES
Subjective   Patient ID: Lito Unger is a 48 y.o. adult who presents for Med Refill.  HPI    OBESITY: weight stable, s/p  bariatric procedure 8/21/2018  He is down 6 pounds form last visit  Unable to work out due to work schedule and knee issues - see below    LEFT KNEE INJURY: had injury when someone  backed into area he was unloading and was pinched between things  Seeing   ? Ortho at [facility] has appointment this week for discussion of surgical repair  Meniscal injury that may need repair    CHRONIC LOW BACK PAIN: taking Percocet 1-2 times daily  Usually taking at night as pain wakes him at night  Wakes 2-3 times per week - usually after increased activity    Still sees chiropractor fairly regularly  Using Tylenol more than the pain meds    REVIEW OF TREATMENT COURSE:  Has had x-rays and MRI with bulging discs at L4 - L5 and L5-S1 along with foraminal stenosis    Failed multiple rounds of PT, conservative treatment NSAIDs, Topamax, Gabapentin and multiple forms of muscle relaxers.    Consultation with Dr. Lynne, pain management, for injections - helped minimally for a short term   They had discussed regarding ablation    Tried Lidocaine patches in the past but had allergic reaction to patch  Tried OTC Aspercreme as compounding pharmacy was too expensive    OARRS:  Jeannie Sanders, DO on 10/26/2023 11:16 AM  I have personally reviewed the OARRS report for Lito Unger. I have considered the risks of abuse, dependence, addiction and diversion    Is the patient prescribed a combination of a benzodiazepine and opioid?  No    Last Urine Drug Screen / ordered today: Yes  Recent Results (from the past 8760 hour(s))   OPIATE/OPIOID/BENZO PRESCRIPTION COMPLIANCE    Collection Time: 01/30/23 10:23 AM   Result Value Ref Range    DRUG SCREEN COMMENT URINE SEE BELOW     Creatine, Urine 20.7 mg/dL    Amphetamine Screen, Urine PRESUMPTIVE NEGATIVE NEGATIVE    Barbiturate Screen, Urine PRESUMPTIVE NEGATIVE NEGATIVE     Cannabinoid Screen, Urine PRESUMPTIVE NEGATIVE NEGATIVE    Cocaine Screen, Urine PRESUMPTIVE NEGATIVE NEGATIVE    PCP Screen, Urine PRESUMPTIVE NEGATIVE NEGATIVE    7-Aminoclonazepam <25 Cutoff <25 ng/mL    Alpha-Hydroxyalprazolam <25 Cutoff <25 ng/mL    Alpha-Hydroxymidazolam <25 Cutoff <25 ng/mL    Alprazolam <25 Cutoff <25 ng/mL    Chlordiazepoxide <25 Cutoff <25 ng/mL    Clonazepam <25 Cutoff <25 ng/mL    Diazepam <25 Cutoff <25 ng/mL    Lorazepam <25 Cutoff <25 ng/mL    Midazolam <25 Cutoff <25 ng/mL    Nordiazepam <25 Cutoff <25 ng/mL    Oxazepam <25 Cutoff <25 ng/mL    Temazepam <25 Cutoff <25 ng/mL    Zolpidem <25 Cutoff <25 ng/mL    Zolpidem Metabolite (ZCA) <25 Cutoff <25 ng/mL    6-Acetylmorphine <25 Cutoff <25 ng/mL    Codeine <50 Cutoff <50 ng/mL    Hydrocodone <25 Cutoff <25 ng/mL    Hydromorphone <25 Cutoff <25 ng/mL    Morphine Urine <50 Cutoff <50 ng/mL    Norhydrocodone <25 Cutoff <25 ng/mL    Noroxycodone 170 (A) Cutoff <25 ng/mL    Oxycodone 203 (A) Cutoff <25 ng/mL    Oxymorphone 341 (A) Cutoff <25 ng/mL    Tramadol <50 Cutoff <50 ng/mL    O-Desmethyltramadol <50 Cutoff <50 ng/mL    Fentanyl <2.5 Cutoff<2.5 ng/mL    Norfentanyl <2.5 Cutoff<2.5 ng/mL    METHADONE CONFIRMATION,URINE <25 Cutoff <25 ng/mL    EDDP <25 Cutoff <25 ng/mL     Results are as expected.     Controlled Substance Agreement:  Date of the Last Agreement: 1/30/2023  Reviewed Controlled Substance Agreement including but not limited to the benefits, risks, and alternatives to treatment with a Controlled Substance medication(s).    Opioids:  What is the patient's goal of therapy? Maintain adequate  Is this being achieved with current treatment? yes    I have calculated the patient's Morphine Dose Equivalent (MED):   I have considered referral to Pain Management and/or a specialist, and do not feel it is necessary at this time.    I feel that it is clinically indicated to continue this current medication regimen after consideration  of alternative therapies, and other non-opioid treatment.    Opioid Risk Screening:  Family History of Substance Abuse  Alcohol: 0 (10/26/2023 11:00 AM)  Illegal Drugs: 0 (10/26/2023 11:00 AM)  Prescription Drugs: 0 (10/26/2023 11:00 AM)    Personal History of Substance Abuse  Alcohol: 0 (10/26/2023 11:00 AM)  Illegal Drugs: 0 (10/26/2023 11:00 AM)  Prescription Drugs: 0 (10/26/2023 11:00 AM)    Patient Age (16-45)  Age (16-45): 0 (10/26/2023 11:00 AM)    History of Preadolescent Sexual Abuse  History of Preadolescent Sexual Abuse: .0 (10/26/2023 11:00 AM)    Psychological Disease  Attention Deficit Disorder, Obsessive Compulsive Disorder, Bipolar, Schizophrenia: 0 (10/26/2023 11:00 AM)  Depression: 1 (10/26/2023 11:00 AM)    Total Score  Total Score: 1 (10/26/2023 11:00 AM)    Total Score Risk Category  TOTAL SCORE CATEGORY: Low Risk (0-3) (10/26/2023 11:00 AM)        Pain Assessment:  Analgesia  What was your pain level on average during the past week?: 7  What was your pain level at its worst during the past week?: 9  What percentage of your pain has been relieved during the past week?: 60 %  Is the amount of pain relief you are now obtaining from your current pain relievers enough to make a real difference in your life?: Y  Query to Clinician: Is the patient's pain relief clinically significant?: Yes    Activities of Daily Living  Physical Functioning: Worse  Family Relationships: Better  Social Relationships: Same  Mood: Same  Sleep Patterns: Same  Overall Functioning: Same    Adverse Events  Is patient experiencing any side effects from current pain relievers?: N  Patient's Overall Severity of Side Effects: None      Assessment  Is your overall impression that this patient is benefiting from opioid therapy?: Yes  Specific Analgesic Plan: Continue present regimen          Review of Systems    Review of Systems negative except as noted in HPI and Chief complaint.     Objective               Physical  "Exam  Constitutional:       General: Lito Unger is not in acute distress.     Appearance: Normal appearance. Lito Unger is not ill-appearing.   HENT:      Head: Normocephalic and atraumatic.   Neck:      Vascular: No carotid bruit.   Cardiovascular:      Rate and Rhythm: Normal rate and regular rhythm.      Pulses: Normal pulses.      Heart sounds: Normal heart sounds. No murmur heard.     No gallop.   Pulmonary:      Effort: Pulmonary effort is normal.      Breath sounds: Normal breath sounds. No wheezing, rhonchi or rales.   Musculoskeletal:      Cervical back: Normal range of motion and neck supple. No rigidity or tenderness.   Lymphadenopathy:      Cervical: No cervical adenopathy.   Skin:     General: Skin is warm and dry.   Neurological:      Mental Status: Lito Unger is alert.   Psychiatric:         Mood and Affect: Mood normal.         Behavior: Behavior normal.       /86 (BP Location: Right arm, Patient Position: Sitting, BP Cuff Size: Large adult)   Pulse 78   Ht 1.854 m (6' 1\")   Wt 104 kg (230 lb)   SpO2 98%   BMI 30.34 kg/m²      Assessment/Plan   Problem List Items Addressed This Visit       Chronic pain    Relevant Medications    oxyCODONE-acetaminophen (Percocet) 5-325 mg tablet    Esophageal reflux     Stable on current PPI.  Continue at current dosage.  Follow up at least annually - soner with any problems or change in symptoms.         Relevant Medications    famotidine (Pepcid) 40 mg tablet    Facet degeneration of lumbar region    Relevant Medications    oxyCODONE-acetaminophen (Percocet) 5-325 mg tablet    Discogenic low back pain     Stable on current regimen - encouraging to follow up with pain management to discuss nerve ablation or other treatment options for chronic pain.         Relevant Medications    metaxalone (Skelaxin) 800 mg tablet    cyclobenzaprine (Fexmid) 7.5 mg tablet    oxyCODONE-acetaminophen (Percocet) 5-325 mg tablet    Lumbar degenerative disc " disease - Primary    Relevant Medications    metaxalone (Skelaxin) 800 mg tablet    cyclobenzaprine (Fexmid) 7.5 mg tablet    Gout, arthropathy     Stable on Allopurinol.  Refilling at current dosage.  Follow up at least annually - sooner with changes or worsening of symptoms.         Morbid obesity (CMS/HCC)     Stable - continues to work at weight loss and to maintain weight loss.            Other Visit Diagnoses       Gout, unspecified cause, unspecified chronicity, unspecified site        Relevant Medications    allopurinol (Zyloprim) 100 mg tablet          FOLLOW UP 3 MONTHS.

## 2023-10-27 NOTE — ASSESSMENT & PLAN NOTE
Stable on Allopurinol.  Refilling at current dosage.  Follow up at least annually - sooner with changes or worsening of symptoms.

## 2023-10-27 NOTE — ASSESSMENT & PLAN NOTE
Stable on current regimen - encouraging to follow up with pain management to discuss nerve ablation or other treatment options for chronic pain.

## 2023-10-27 NOTE — ASSESSMENT & PLAN NOTE
Stable on current PPI.  Continue at current dosage.  Follow up at least annually - soner with any problems or change in symptoms.

## 2023-11-28 DIAGNOSIS — M51.36 DISCOGENIC LOW BACK PAIN: ICD-10-CM

## 2023-11-28 DIAGNOSIS — G89.29 OTHER CHRONIC PAIN: ICD-10-CM

## 2023-11-28 DIAGNOSIS — M47.816 FACET DEGENERATION OF LUMBAR REGION: ICD-10-CM

## 2023-11-28 RX ORDER — OXYCODONE AND ACETAMINOPHEN 5; 325 MG/1; MG/1
1-2 TABLET ORAL EVERY 6 HOURS PRN
Qty: 40 TABLET | Refills: 0 | Status: SHIPPED | OUTPATIENT
Start: 2023-11-28 | End: 2023-12-28 | Stop reason: SDUPTHER

## 2023-11-28 NOTE — TELEPHONE ENCOUNTER
Patient is calling in for a refill on his oxycodone, pended    LAST OV: 10/26/2023  UDS: 01/30/2023

## 2023-12-11 ENCOUNTER — TELEPHONE (OUTPATIENT)
Dept: PRIMARY CARE | Facility: CLINIC | Age: 48
End: 2023-12-11
Payer: COMMERCIAL

## 2023-12-11 NOTE — TELEPHONE ENCOUNTER
Pt called and states he have a problem in his rectum area that has reoccurred. Please contact pt for further details.

## 2023-12-14 ENCOUNTER — OFFICE VISIT (OUTPATIENT)
Dept: PRIMARY CARE | Facility: CLINIC | Age: 48
End: 2023-12-14
Payer: COMMERCIAL

## 2023-12-14 VITALS — HEART RATE: 87 BPM | OXYGEN SATURATION: 98 % | DIASTOLIC BLOOD PRESSURE: 80 MMHG | SYSTOLIC BLOOD PRESSURE: 130 MMHG

## 2023-12-14 DIAGNOSIS — K62.5 RECTAL BLEEDING: ICD-10-CM

## 2023-12-14 DIAGNOSIS — F32.A ANXIETY AND DEPRESSION: ICD-10-CM

## 2023-12-14 DIAGNOSIS — Z12.11 ENCOUNTER FOR SCREENING FOR MALIGNANT NEOPLASM OF COLON: Primary | ICD-10-CM

## 2023-12-14 DIAGNOSIS — F41.9 ANXIETY AND DEPRESSION: ICD-10-CM

## 2023-12-14 PROBLEM — Z86.79 HISTORY OF HYPERTENSION: Status: ACTIVE | Noted: 2023-11-06

## 2023-12-14 PROBLEM — Z86.69 HISTORY OF SLEEP APNEA: Status: ACTIVE | Noted: 2023-11-06

## 2023-12-14 PROBLEM — Z98.84 HISTORY OF BARIATRIC SURGERY: Status: ACTIVE | Noted: 2023-11-06

## 2023-12-14 PROCEDURE — 3008F BODY MASS INDEX DOCD: CPT | Performed by: FAMILY MEDICINE

## 2023-12-14 PROCEDURE — 3075F SYST BP GE 130 - 139MM HG: CPT | Performed by: FAMILY MEDICINE

## 2023-12-14 PROCEDURE — 1036F TOBACCO NON-USER: CPT | Performed by: FAMILY MEDICINE

## 2023-12-14 PROCEDURE — 99214 OFFICE O/P EST MOD 30 MIN: CPT | Performed by: FAMILY MEDICINE

## 2023-12-14 PROCEDURE — 3079F DIAST BP 80-89 MM HG: CPT | Performed by: FAMILY MEDICINE

## 2023-12-14 RX ORDER — HYDROCODONE BITARTRATE AND ACETAMINOPHEN 5; 325 MG/1; MG/1
1 TABLET ORAL EVERY 8 HOURS PRN
COMMUNITY
Start: 2023-11-30 | End: 2024-01-26 | Stop reason: ALTCHOICE

## 2023-12-14 RX ORDER — HYDROCORTISONE ACETATE 25 MG/1
25 SUPPOSITORY RECTAL 2 TIMES DAILY PRN
Qty: 30 SUPPOSITORY | Refills: 5 | Status: SHIPPED | OUTPATIENT
Start: 2023-12-14 | End: 2024-04-10 | Stop reason: ALTCHOICE

## 2023-12-14 ASSESSMENT — ANXIETY QUESTIONNAIRES
1. FEELING NERVOUS, ANXIOUS, OR ON EDGE: SEVERAL DAYS
7. FEELING AFRAID AS IF SOMETHING AWFUL MIGHT HAPPEN: MORE THAN HALF THE DAYS
3. WORRYING TOO MUCH ABOUT DIFFERENT THINGS: MORE THAN HALF THE DAYS
IF YOU CHECKED OFF ANY PROBLEMS ON THIS QUESTIONNAIRE, HOW DIFFICULT HAVE THESE PROBLEMS MADE IT FOR YOU TO DO YOUR WORK, TAKE CARE OF THINGS AT HOME, OR GET ALONG WITH OTHER PEOPLE: SOMEWHAT DIFFICULT
6. BECOMING EASILY ANNOYED OR IRRITABLE: SEVERAL DAYS
2. NOT BEING ABLE TO STOP OR CONTROL WORRYING: MORE THAN HALF THE DAYS
5. BEING SO RESTLESS THAT IT IS HARD TO SIT STILL: NOT AT ALL
GAD7 TOTAL SCORE: 10
4. TROUBLE RELAXING: MORE THAN HALF THE DAYS

## 2023-12-14 ASSESSMENT — PATIENT HEALTH QUESTIONNAIRE - PHQ9
6. FEELING BAD ABOUT YOURSELF - OR THAT YOU ARE A FAILURE OR HAVE LET YOURSELF OR YOUR FAMILY DOWN: NEARLY EVERY DAY
SUM OF ALL RESPONSES TO PHQ QUESTIONS 1-9: 12
2. FEELING DOWN, DEPRESSED OR HOPELESS: SEVERAL DAYS
7. TROUBLE CONCENTRATING ON THINGS, SUCH AS READING THE NEWSPAPER OR WATCHING TELEVISION: SEVERAL DAYS
4. FEELING TIRED OR HAVING LITTLE ENERGY: MORE THAN HALF THE DAYS
9. THOUGHTS THAT YOU WOULD BE BETTER OFF DEAD, OR OF HURTING YOURSELF: NOT AT ALL
3. TROUBLE FALLING OR STAYING ASLEEP OR SLEEPING TOO MUCH: MORE THAN HALF THE DAYS
SUM OF ALL RESPONSES TO PHQ9 QUESTIONS 1 AND 2: 2
5. POOR APPETITE OR OVEREATING: MORE THAN HALF THE DAYS
10. IF YOU CHECKED OFF ANY PROBLEMS, HOW DIFFICULT HAVE THESE PROBLEMS MADE IT FOR YOU TO DO YOUR WORK, TAKE CARE OF THINGS AT HOME, OR GET ALONG WITH OTHER PEOPLE: SOMEWHAT DIFFICULT
1. LITTLE INTEREST OR PLEASURE IN DOING THINGS: SEVERAL DAYS
8. MOVING OR SPEAKING SO SLOWLY THAT OTHER PEOPLE COULD HAVE NOTICED. OR THE OPPOSITE, BEING SO FIGETY OR RESTLESS THAT YOU HAVE BEEN MOVING AROUND A LOT MORE THAN USUAL: NOT AT ALL

## 2023-12-14 ASSESSMENT — ENCOUNTER SYMPTOMS: HEMATOCHEZIA: 1

## 2023-12-14 NOTE — LETTER
To Whom This May Concern:    Lito Unger is a longstanding patient in my practice who is currently suffering from gastrointestinal issues as well as acute exacerbation of anxiety and depression.  These medical issues are affecting his ability to carry out daily activities.  It is my recommendation that he be given time to complete further workup and pursue treatment options for these medical issues.  Please feel free to contact my office with any questions or concerns.      Respectfully,      Jeannie Sanders D.O.  [facility]

## 2023-12-14 NOTE — PROGRESS NOTES
Subjective   Patient ID: Lito Unger is a 48 y.o. adult who presents for Rectal Bleeding (GI issues, on going. ).  Rectal Bleeding        5/2023 had follow up with Dr. Ender VILLEGAS with rectal wall thickening - has not had Gi consultation recently.  Last colonoscopy 2018 with rectal polyps and several were removed - follow up was to be this year (5 year rachael)    Now with with blood noted in his underwear - spotting  Sometimes with BM sometimes not      Review of Systems   Gastrointestinal:  Positive for hematochezia.     LEFT KNEE S/P MENISCAL REPAIR:  11/30/2023 Dr. Tavarez at [facility]  Now with some intermittent burning sensation after he stepped wrong    ANXIETY & DEPRESSION:   January 2024 going to court date and hoping to postpone due to medical issues.  Feeling more anxious and depressed.  Unable to carryout daily activities without significant effort.    He and his wife are looking for counselors and family counseling for their twin daughters.    Review of Systems negative except as noted in HPI and Chief complaint.     Objective     Patient Health Questionnaire-9 Score: 12     YESENIA-7 Total Score: 10       /80 (BP Location: Left arm, Patient Position: Sitting, BP Cuff Size: Large adult)   Pulse 87   SpO2 98%      Physical Exam  Constitutional:       General: Lito Unger is not in acute distress.     Appearance: Normal appearance. Lito Unger is not ill-appearing.   HENT:      Head: Normocephalic and atraumatic.   Neck:      Vascular: No carotid bruit.   Cardiovascular:      Rate and Rhythm: Normal rate and regular rhythm.      Pulses: Normal pulses.      Heart sounds: Normal heart sounds. No murmur heard.     No gallop.   Pulmonary:      Effort: Pulmonary effort is normal.      Breath sounds: Normal breath sounds. No wheezing, rhonchi or rales.   Musculoskeletal:      Cervical back: Normal range of motion and neck supple. No rigidity or tenderness.   Lymphadenopathy:      Cervical: No cervical  adenopathy.   Skin:     General: Skin is warm and dry.   Neurological:      Mental Status: Lito Unger is alert.   Psychiatric:         Mood and Affect: Mood normal.         Behavior: Behavior normal.         Assessment/Plan   Problem List Items Addressed This Visit    None  Visit Diagnoses       Encounter for screening for malignant neoplasm of colon    -  Primary    Relevant Orders    Colonoscopy Screening; Average Risk Patient    Rectal bleeding        Relevant Medications    hydrocortisone (Anusol-HC) 25 mg suppository    Other Relevant Orders    Colonoscopy Screening; Average Risk Patient    Anxiety and depression        Relevant Orders    Referral to Psychology    Referral to [facility]          FOLLOW UP 1 MONTH as SCHEDULED.

## 2023-12-19 DIAGNOSIS — K57.32 DIVERTICULITIS OF COLON: ICD-10-CM

## 2023-12-19 RX ORDER — POLYETHYLENE GLYCOL 3350, SODIUM SULFATE ANHYDROUS, SODIUM BICARBONATE, SODIUM CHLORIDE, POTASSIUM CHLORIDE 236; 22.74; 6.74; 5.86; 2.97 G/4L; G/4L; G/4L; G/4L; G/4L
4000 POWDER, FOR SOLUTION ORAL ONCE
Qty: 4000 ML | Refills: 0 | Status: SHIPPED | OUTPATIENT
Start: 2023-12-19 | End: 2023-12-19

## 2023-12-28 DIAGNOSIS — M47.816 FACET DEGENERATION OF LUMBAR REGION: ICD-10-CM

## 2023-12-28 DIAGNOSIS — M51.36 DISCOGENIC LOW BACK PAIN: ICD-10-CM

## 2023-12-28 DIAGNOSIS — G89.29 OTHER CHRONIC PAIN: ICD-10-CM

## 2023-12-28 RX ORDER — OXYCODONE AND ACETAMINOPHEN 5; 325 MG/1; MG/1
1-2 TABLET ORAL EVERY 6 HOURS PRN
Qty: 40 TABLET | Refills: 0 | Status: SHIPPED | OUTPATIENT
Start: 2023-12-28 | End: 2024-01-26 | Stop reason: SDUPTHER

## 2023-12-28 NOTE — TELEPHONE ENCOUNTER
PATIENT CALLED AND IS LEAVING FOR OUT OF TOWN AT 12PM AND NEEDS HIS REFILL SENT OVER HE HAS TRIED CALLING BUT OUR OFFICE WAS CLOSED    Name: TUNDE MIKE  : [id number]  Medication Name: OXYCODONE  Dose: 5-325MG 1-2  Frequency: PRN EVERY 6 HR   Pharmacy: [facility]  Quantity left: 0  Last appointment: 10.26.23  Next appointment: 24    ONCE SINCE IF YOU COULD LET ME NOW SO I CAN LET HIM KNOW HE WILL CALL THEM SO THEY KNOW TO FILL ASAP

## 2024-01-10 ENCOUNTER — APPOINTMENT (OUTPATIENT)
Dept: GASTROENTEROLOGY | Facility: EXTERNAL LOCATION | Age: 49
End: 2024-01-10
Payer: COMMERCIAL

## 2024-01-26 ENCOUNTER — OFFICE VISIT (OUTPATIENT)
Dept: PRIMARY CARE | Facility: CLINIC | Age: 49
End: 2024-01-26
Payer: COMMERCIAL

## 2024-01-26 VITALS — BODY MASS INDEX: 29.69 KG/M2 | HEART RATE: 86 BPM | OXYGEN SATURATION: 97 % | WEIGHT: 225 LBS

## 2024-01-26 DIAGNOSIS — G89.4 CHRONIC PAIN SYNDROME: ICD-10-CM

## 2024-01-26 DIAGNOSIS — Z79.899 HIGH RISK MEDICATION USE: ICD-10-CM

## 2024-01-26 DIAGNOSIS — J30.89 ALLERGIC RHINITIS DUE TO OTHER ALLERGIC TRIGGER, UNSPECIFIED SEASONALITY: ICD-10-CM

## 2024-01-26 DIAGNOSIS — G89.29 OTHER CHRONIC PAIN: ICD-10-CM

## 2024-01-26 DIAGNOSIS — I10 PRIMARY HYPERTENSION: ICD-10-CM

## 2024-01-26 DIAGNOSIS — M47.816 FACET DEGENERATION OF LUMBAR REGION: ICD-10-CM

## 2024-01-26 DIAGNOSIS — M51.36 LUMBAR DEGENERATIVE DISC DISEASE: Primary | ICD-10-CM

## 2024-01-26 DIAGNOSIS — M51.36 DISCOGENIC LOW BACK PAIN: ICD-10-CM

## 2024-01-26 PROCEDURE — 80361 OPIATES 1 OR MORE: CPT

## 2024-01-26 PROCEDURE — 80307 DRUG TEST PRSMV CHEM ANLYZR: CPT

## 2024-01-26 PROCEDURE — 80365 DRUG SCREENING OXYCODONE: CPT

## 2024-01-26 PROCEDURE — 1036F TOBACCO NON-USER: CPT | Performed by: FAMILY MEDICINE

## 2024-01-26 PROCEDURE — 3008F BODY MASS INDEX DOCD: CPT | Performed by: FAMILY MEDICINE

## 2024-01-26 PROCEDURE — 99214 OFFICE O/P EST MOD 30 MIN: CPT | Performed by: FAMILY MEDICINE

## 2024-01-26 RX ORDER — CYCLOBENZAPRINE HYDROCHLORIDE 7.5 MG/1
7.5 TABLET, FILM COATED ORAL 3 TIMES DAILY PRN
Qty: 60 TABLET | Refills: 2 | Status: SHIPPED | OUTPATIENT
Start: 2024-01-26

## 2024-01-26 RX ORDER — FLUTICASONE PROPIONATE 50 MCG
1 SPRAY, SUSPENSION (ML) NASAL DAILY
Qty: 16 G | Refills: 11 | Status: SHIPPED | OUTPATIENT
Start: 2024-01-26 | End: 2024-04-10 | Stop reason: ALTCHOICE

## 2024-01-26 RX ORDER — LISINOPRIL 10 MG/1
10 TABLET ORAL DAILY
Qty: 30 TABLET | Refills: 1 | Status: SHIPPED | OUTPATIENT
Start: 2024-01-26 | End: 2024-04-10 | Stop reason: ALTCHOICE

## 2024-01-26 RX ORDER — NALOXONE HYDROCHLORIDE 4 MG/.1ML
4 SPRAY NASAL AS NEEDED
Qty: 2 EACH | Refills: 0 | Status: SHIPPED | OUTPATIENT
Start: 2024-01-26

## 2024-01-26 RX ORDER — OXYCODONE AND ACETAMINOPHEN 5; 325 MG/1; MG/1
1-2 TABLET ORAL EVERY 6 HOURS PRN
Qty: 60 TABLET | Refills: 0 | Status: SHIPPED | OUTPATIENT
Start: 2024-01-26 | End: 2024-02-26 | Stop reason: SDUPTHER

## 2024-01-26 NOTE — ASSESSMENT & PLAN NOTE
Resuming Lisinopril 10 mg.  Update with home BP readings in 2 weeks.  Follow up 6 months if BP controlled.

## 2024-01-26 NOTE — PROGRESS NOTES
Subjective   Patient ID: Lito Unger is a 48 y.o. adult who presents for Follow-up.    HPI    STRESS: starting family therapy with his wife and daughters with things at home  They have individual counselors at same site and he is hoping to establish wih someone for himself.    GERD: symptoms better on Omeprazole and Famotidine  Seems to do better with Famotidine but only taking prn  Recently had flare with eating [employer] chocolate eggs  Not waking at night with heartburn sx    GOUT: no recent flares with regular Allopurinol    CHRONIC LOW BACK PAIN:  Pain much worse this week - tried going to chiropractor which helped for a few minutes  Failed NSAIDs due to stomach issues and s/p gastric bipass  Topamax gave him psychiatric symptoms    OARRS:  Jeannie Sanders DO on 1/26/2024 10:16 AM  I have personally reviewed the OARRS report for Lito Unger. I have considered the risks of abuse, dependence, addiction and diversion    Is the patient prescribed a combination of a benzodiazepine and opioid?  No    Last Urine Drug Screen / ordered today: Yes  Recent Results (from the past 8760 hour(s))   Opiate Confirmation, Urine    Collection Time: 01/26/24 10:48 AM   Result Value Ref Range    6-Acetylmorphine <25 <25 ng/mL    Codeine <50 <50 ng/mL    Hydrocodone <25 <25 ng/mL    Hydromorphone <25 <25 ng/mL    Morphine  <50 <50 ng/mL    Norhydrocodone <25 <25 ng/mL    Noroxycodone <25 <25 ng/mL    Oxycodone <25 <25 ng/mL    Oxymorphone <25 <25 ng/mL   Drug Screen, Urine With Reflex to Confirmation    Collection Time: 01/26/24 10:48 AM   Result Value Ref Range    Amphetamine Screen, Urine Presumptive Negative Presumptive Negative    Barbiturate Screen, Urine Presumptive Negative Presumptive Negative    Benzodiazepines Screen, Urine Presumptive Negative Presumptive Negative    Cannabinoid Screen, Urine Presumptive Negative Presumptive Negative    Cocaine Metabolite Screen, Urine Presumptive Negative Presumptive Negative     Fentanyl Screen, Urine Presumptive Negative Presumptive Negative    Opiate Screen, Urine Presumptive Negative Presumptive Negative    Oxycodone Screen, Urine Presumptive Negative Presumptive Negative    PCP Screen, Urine Presumptive Negative Presumptive Negative     Results are as expected.   Clinical rationale for not completing a Urine Drug Screen:     Controlled Substance Agreement:  Date of the Last Agreement: 1/26/2024  Reviewed Controlled Substance Agreement including but not limited to the benefits, risks, and alternatives to treatment with a Controlled Substance medication(s).    Opioids:  What is the patient's goal of therapy? Maintain adequate pain levels to carry out daily activities  Is this being achieved with current treatment? yes    I have calculated the patient's Morphine Dose Equivalent (MED):   I have considered referral to Pain Management and/or a specialist, and do not feel it is necessary at this time.    I feel that it is clinically indicated to continue this current medication regimen after consideration of alternative therapies, and other non-opioid treatment.    Opioid Risk Screening:  Family History of Substance Abuse  Alcohol: 0 (10/26/2023 11:00 AM)  Illegal Drugs: 0 (10/26/2023 11:00 AM)  Prescription Drugs: 0 (10/26/2023 11:00 AM)    Personal History of Substance Abuse  Alcohol: 0 (10/26/2023 11:00 AM)  Illegal Drugs: 0 (10/26/2023 11:00 AM)  Prescription Drugs: 0 (10/26/2023 11:00 AM)    Patient Age (16-45)  Age (16-45): 0 (10/26/2023 11:00 AM)    History of Preadolescent Sexual Abuse  History of Preadolescent Sexual Abuse: .0 (10/26/2023 11:00 AM)    Psychological Disease  Attention Deficit Disorder, Obsessive Compulsive Disorder, Bipolar, Schizophrenia: 0 (10/26/2023 11:00 AM)  Depression: 1 (10/26/2023 11:00 AM)    Total Score  Total Score: 1 (10/26/2023 11:00 AM)    Total Score Risk Category  TOTAL SCORE CATEGORY: Low Risk (0-3) (10/26/2023 11:00 AM)        Pain Assessment:  No  data recorded        Review of Systems    Review of Systems negative except as noted in HPI and Chief complaint.     Objective                 Pulse 86   Wt 102 kg (225 lb)   SpO2 97%   BMI 29.69 kg/m²      Physical Exam  Constitutional:       General: Lito Unger is not in acute distress.     Appearance: Normal appearance. Lito Unger is not ill-appearing.   HENT:      Head: Normocephalic and atraumatic.   Neck:      Vascular: No carotid bruit.   Cardiovascular:      Rate and Rhythm: Normal rate and regular rhythm.      Pulses: Normal pulses.      Heart sounds: Normal heart sounds. No murmur heard.     No gallop.   Pulmonary:      Effort: Pulmonary effort is normal.      Breath sounds: Normal breath sounds. No wheezing, rhonchi or rales.   Musculoskeletal:      Cervical back: Normal range of motion and neck supple. No rigidity or tenderness.   Lymphadenopathy:      Cervical: No cervical adenopathy.   Skin:     General: Skin is warm and dry.   Neurological:      Mental Status: Lito Unger is alert.   Psychiatric:         Mood and Affect: Mood normal.         Behavior: Behavior normal.         Assessment/Plan   Problem List Items Addressed This Visit       Chronic pain    Relevant Medications    oxyCODONE-acetaminophen (Percocet) 5-325 mg tablet    Other Relevant Orders    Referral to Pain Medicine    Facet degeneration of lumbar region    Relevant Medications    oxyCODONE-acetaminophen (Percocet) 5-325 mg tablet    Other Relevant Orders    Referral to Pain Medicine    Discogenic low back pain    Relevant Medications    cyclobenzaprine (Fexmid) 7.5 mg tablet    oxyCODONE-acetaminophen (Percocet) 5-325 mg tablet    Other Relevant Orders    Referral to Pain Medicine    Lumbar degenerative disc disease - Primary    Relevant Medications    cyclobenzaprine (Fexmid) 7.5 mg tablet    Other Relevant Orders    Referral to Pain Medicine    Primary hypertension     Resuming Lisinopril 10 mg.  Update with home  BP readings in 2 weeks.  Follow up 6 months if BP controlled.         Relevant Medications    lisinopril 10 mg tablet     Other Visit Diagnoses       Allergic rhinitis due to other allergic trigger, unspecified seasonality        Relevant Medications    fluticasone (Flonase) 50 mcg/actuation nasal spray    High risk medication use        Relevant Medications    naloxone (Narcan) 4 mg/0.1 mL nasal spray    Other Relevant Orders    Drug Screen, Urine With Reflex to Confirmation (Completed)    Opiate Confirmation, Urine (Completed)    OOB Internal Tracking (Completed)          FOLLOW UP 3 MONTHS PER CONTROLLED SUBSTANCE PROTOCOLS, SOONER WITH ANY PROBLEMS OR CONCERNS.

## 2024-01-27 LAB
AMPHETAMINES UR QL SCN: NORMAL
BARBITURATES UR QL SCN: NORMAL
BENZODIAZ UR QL SCN: NORMAL
BZE UR QL SCN: NORMAL
CANNABINOIDS UR QL SCN: NORMAL
FENTANYL+NORFENTANYL UR QL SCN: NORMAL
OPIATES UR QL SCN: NORMAL
OXYCODONE+OXYMORPHONE UR QL SCN: NORMAL
PCP UR QL SCN: NORMAL

## 2024-02-01 LAB
6MAM UR CFM-MCNC: <25 NG/ML
CODEINE UR CFM-MCNC: <50 NG/ML
HYDROCODONE CTO UR CFM-MCNC: <25 NG/ML
HYDROMORPHONE UR CFM-MCNC: <25 NG/ML
MORPHINE UR CFM-MCNC: <50 NG/ML
NORHYDROCODONE UR CFM-MCNC: <25 NG/ML
NOROXYCODONE UR CFM-MCNC: <25 NG/ML
OXYCODONE UR CFM-MCNC: <25 NG/ML
OXYMORPHONE UR CFM-MCNC: <25 NG/ML

## 2024-02-09 ENCOUNTER — APPOINTMENT (OUTPATIENT)
Dept: GASTROENTEROLOGY | Facility: EXTERNAL LOCATION | Age: 49
End: 2024-02-09
Payer: COMMERCIAL

## 2024-02-12 ASSESSMENT — ANXIETY QUESTIONNAIRES
6. BECOMING EASILY ANNOYED OR IRRITABLE: NOT AT ALL
4. TROUBLE RELAXING: NOT AT ALL
5. BEING SO RESTLESS THAT IT IS HARD TO SIT STILL: NOT AT ALL
IF YOU CHECKED OFF ANY PROBLEMS ON THIS QUESTIONNAIRE, HOW DIFFICULT HAVE THESE PROBLEMS MADE IT FOR YOU TO DO YOUR WORK, TAKE CARE OF THINGS AT HOME, OR GET ALONG WITH OTHER PEOPLE: NOT DIFFICULT AT ALL
1. FEELING NERVOUS, ANXIOUS, OR ON EDGE: NOT AT ALL
2. NOT BEING ABLE TO STOP OR CONTROL WORRYING: SEVERAL DAYS
3. WORRYING TOO MUCH ABOUT DIFFERENT THINGS: SEVERAL DAYS
GAD7 TOTAL SCORE: 2
7. FEELING AFRAID AS IF SOMETHING AWFUL MIGHT HAPPEN: NOT AT ALL

## 2024-02-12 ASSESSMENT — PATIENT HEALTH QUESTIONNAIRE - PHQ9
6. FEELING BAD ABOUT YOURSELF - OR THAT YOU ARE A FAILURE OR HAVE LET YOURSELF OR YOUR FAMILY DOWN: SEVERAL DAYS
2. FEELING DOWN, DEPRESSED OR HOPELESS: SEVERAL DAYS
1. LITTLE INTEREST OR PLEASURE IN DOING THINGS: NOT AT ALL
SUM OF ALL RESPONSES TO PHQ9 QUESTIONS 1 AND 2: 1
7. TROUBLE CONCENTRATING ON THINGS, SUCH AS READING THE NEWSPAPER OR WATCHING TELEVISION: NOT AT ALL
SUM OF ALL RESPONSES TO PHQ QUESTIONS 1-9: 6
3. TROUBLE FALLING OR STAYING ASLEEP OR SLEEPING TOO MUCH: NEARLY EVERY DAY
4. FEELING TIRED OR HAVING LITTLE ENERGY: NOT AT ALL
10. IF YOU CHECKED OFF ANY PROBLEMS, HOW DIFFICULT HAVE THESE PROBLEMS MADE IT FOR YOU TO DO YOUR WORK, TAKE CARE OF THINGS AT HOME, OR GET ALONG WITH OTHER PEOPLE: NOT DIFFICULT AT ALL
8. MOVING OR SPEAKING SO SLOWLY THAT OTHER PEOPLE COULD HAVE NOTICED. OR THE OPPOSITE, BEING SO FIGETY OR RESTLESS THAT YOU HAVE BEEN MOVING AROUND A LOT MORE THAN USUAL: NOT AT ALL
5. POOR APPETITE OR OVEREATING: SEVERAL DAYS
9. THOUGHTS THAT YOU WOULD BE BETTER OFF DEAD, OR OF HURTING YOURSELF: NOT AT ALL

## 2024-02-26 ENCOUNTER — TELEPHONE (OUTPATIENT)
Dept: PRIMARY CARE | Facility: CLINIC | Age: 49
End: 2024-02-26
Payer: COMMERCIAL

## 2024-02-26 DIAGNOSIS — M51.36 DISCOGENIC LOW BACK PAIN: ICD-10-CM

## 2024-02-26 DIAGNOSIS — M47.816 FACET DEGENERATION OF LUMBAR REGION: ICD-10-CM

## 2024-02-26 DIAGNOSIS — G89.29 OTHER CHRONIC PAIN: ICD-10-CM

## 2024-02-26 RX ORDER — OXYCODONE AND ACETAMINOPHEN 5; 325 MG/1; MG/1
1-2 TABLET ORAL EVERY 6 HOURS PRN
Qty: 60 TABLET | Refills: 0 | Status: SHIPPED | OUTPATIENT
Start: 2024-02-26 | End: 2024-03-26 | Stop reason: SDUPTHER

## 2024-02-26 NOTE — TELEPHONE ENCOUNTER
Unable to send rx for controlled substances to [facility] - their system must be down again.  I did print rx and will have staff call him to let him know.

## 2024-03-04 ENCOUNTER — HOSPITAL ENCOUNTER (OUTPATIENT)
Dept: RADIOLOGY | Facility: HOSPITAL | Age: 49
Discharge: HOME | End: 2024-03-04
Payer: COMMERCIAL

## 2024-03-04 ENCOUNTER — OFFICE VISIT (OUTPATIENT)
Dept: PRIMARY CARE | Facility: CLINIC | Age: 49
End: 2024-03-04
Payer: COMMERCIAL

## 2024-03-04 ENCOUNTER — LAB (OUTPATIENT)
Dept: LAB | Facility: LAB | Age: 49
End: 2024-03-04
Payer: COMMERCIAL

## 2024-03-04 VITALS
DIASTOLIC BLOOD PRESSURE: 84 MMHG | SYSTOLIC BLOOD PRESSURE: 130 MMHG | TEMPERATURE: 97.7 F | OXYGEN SATURATION: 98 % | HEART RATE: 71 BPM | WEIGHT: 228 LBS | BODY MASS INDEX: 30.08 KG/M2

## 2024-03-04 DIAGNOSIS — R10.84 GENERALIZED ABDOMINAL PAIN: ICD-10-CM

## 2024-03-04 DIAGNOSIS — R10.13 EPIGASTRIC PAIN: ICD-10-CM

## 2024-03-04 DIAGNOSIS — F41.9 ANXIETY AND DEPRESSION: ICD-10-CM

## 2024-03-04 DIAGNOSIS — R10.13 EPIGASTRIC PAIN: Primary | ICD-10-CM

## 2024-03-04 DIAGNOSIS — F32.A ANXIETY AND DEPRESSION: ICD-10-CM

## 2024-03-04 LAB
ALBUMIN SERPL BCP-MCNC: 4.5 G/DL (ref 3.4–5)
ALP SERPL-CCNC: 78 U/L (ref 33–120)
ALT SERPL W P-5'-P-CCNC: 19 U/L (ref 7–52)
ANION GAP SERPL CALC-SCNC: 11 MMOL/L (ref 10–20)
AST SERPL W P-5'-P-CCNC: 23 U/L (ref 9–39)
BILIRUB SERPL-MCNC: 0.6 MG/DL (ref 0–1.2)
BUN SERPL-MCNC: 5 MG/DL (ref 6–23)
CALCIUM SERPL-MCNC: 10.1 MG/DL (ref 8.6–10.6)
CHLORIDE SERPL-SCNC: 103 MMOL/L (ref 98–107)
CO2 SERPL-SCNC: 34 MMOL/L (ref 21–32)
CREAT SERPL-MCNC: 0.83 MG/DL (ref 0.5–1.3)
EGFRCR SERPLBLD CKD-EPI 2021: 88 ML/MIN/1.73M*2
ERYTHROCYTE [DISTWIDTH] IN BLOOD BY AUTOMATED COUNT: 13.1 % (ref 11.5–14.5)
GLUCOSE SERPL-MCNC: 98 MG/DL (ref 74–99)
HCT VFR BLD AUTO: 43.6 % (ref 36–52)
HGB BLD-MCNC: 13.8 G/DL (ref 12–17.5)
MCH RBC QN AUTO: 28.1 PG (ref 26–34)
MCHC RBC AUTO-ENTMCNC: 31.7 G/DL (ref 32–36)
MCV RBC AUTO: 89 FL (ref 80–100)
NRBC BLD-RTO: 0 /100 WBCS (ref 0–0)
PLATELET # BLD AUTO: 332 X10*3/UL (ref 150–450)
POTASSIUM SERPL-SCNC: 4.7 MMOL/L (ref 3.5–5.3)
PROT SERPL-MCNC: 6.9 G/DL (ref 6.4–8.2)
RBC # BLD AUTO: 4.91 X10*6/UL (ref 4–5.9)
SODIUM SERPL-SCNC: 143 MMOL/L (ref 136–145)
WBC # BLD AUTO: 7.1 X10*3/UL (ref 4.4–11.3)

## 2024-03-04 PROCEDURE — 76705 ECHO EXAM OF ABDOMEN: CPT | Performed by: RADIOLOGY

## 2024-03-04 PROCEDURE — 99214 OFFICE O/P EST MOD 30 MIN: CPT | Performed by: FAMILY MEDICINE

## 2024-03-04 PROCEDURE — 3008F BODY MASS INDEX DOCD: CPT | Performed by: FAMILY MEDICINE

## 2024-03-04 PROCEDURE — 1036F TOBACCO NON-USER: CPT | Performed by: FAMILY MEDICINE

## 2024-03-04 PROCEDURE — 80053 COMPREHEN METABOLIC PANEL: CPT

## 2024-03-04 PROCEDURE — 76705 ECHO EXAM OF ABDOMEN: CPT

## 2024-03-04 PROCEDURE — 85027 COMPLETE CBC AUTOMATED: CPT

## 2024-03-04 PROCEDURE — 3079F DIAST BP 80-89 MM HG: CPT | Performed by: FAMILY MEDICINE

## 2024-03-04 PROCEDURE — 3075F SYST BP GE 130 - 139MM HG: CPT | Performed by: FAMILY MEDICINE

## 2024-03-04 PROCEDURE — 36415 COLL VENOUS BLD VENIPUNCTURE: CPT

## 2024-03-04 ASSESSMENT — PAIN SCALES - GENERAL: PAINLEVEL: 4

## 2024-03-04 NOTE — LETTER
To Whom This May Concern:    Lito Unger is a longstanding patient in my practice who is currently suffering from worsening gastrointestinal issues as well as acute exacerbation of anxiety and depression.  He is currently scheduled for procedures starting 3/11/2024 for these issues. It is my recommendation that he be given time to complete further workup and pursue treatment options for these medical issues.  Please feel free to contact my office with any questions or concerns.      Respectfully,      Jeannie Sanders D.O.  [facility]

## 2024-03-04 NOTE — PROGRESS NOTES
Subjective   Patient ID: Lito Unger is a 48 y.o. adult who presents for Abdominal Pain.    HPI    S/p gastric bi pass procedure 2018  Has had issues with meat after surgery  Previously had swallowing test before surgery and slow transit he thought  After surgery was not vomiting but mild nausea persists.    Left sided abdominal pain - worse with eating, does not matter when he eats  Bowel movements darker and having some rectal bleeding as well  Denies dysphagia, a few episodes of vomiting about a week ago    Colonoscopy 3/11/2024 with Dr. Chery    Appetite poor     Review of Systems    Review of Systems negative except as noted in HPI and Chief complaint.     Objective                 /84 (BP Location: Left arm, Patient Position: Sitting, BP Cuff Size: Adult)   Pulse 71   Temp 36.5 °C (97.7 °F) (Temporal)   Wt 103 kg (228 lb)   SpO2 98%   BMI 30.08 kg/m²      Physical Exam  Constitutional:       General: Lito Unger is not in acute distress.     Appearance: Normal appearance. Lito Unger is not ill-appearing.   HENT:      Head: Normocephalic and atraumatic.   Neck:      Vascular: No carotid bruit.   Cardiovascular:      Rate and Rhythm: Normal rate and regular rhythm.      Pulses: Normal pulses.      Heart sounds: Normal heart sounds. No murmur heard.     No gallop.   Pulmonary:      Effort: Pulmonary effort is normal.      Breath sounds: Normal breath sounds. No wheezing, rhonchi or rales.   Abdominal:      General: Bowel sounds are normal.      Palpations: Abdomen is soft.      Comments: Tenderness to deep palpation over epigastric area  Positive Nunez's sign   Musculoskeletal:      Cervical back: Normal range of motion and neck supple. No rigidity or tenderness.   Lymphadenopathy:      Cervical: No cervical adenopathy.   Skin:     General: Skin is warm and dry.   Neurological:      Mental Status: Lito Unger is alert.   Psychiatric:         Mood and Affect: Mood normal.          Behavior: Behavior normal.         Assessment/Plan   Problem List Items Addressed This Visit       Epigastric pain - Primary    Relevant Orders    EGD    CBC (Completed)    Comprehensive metabolic panel (Completed)     Other Visit Diagnoses       Generalized abdominal pain        Relevant Orders    CBC (Completed)    Comprehensive metabolic panel (Completed)    US gallbladder (Completed)    Anxiety and depression        Note of support for legal proceedings.  Consider resuming SSRI or referral to psychology for counseling.  Follow up 3 months.

## 2024-03-06 ENCOUNTER — TELEPHONE (OUTPATIENT)
Dept: GASTROENTEROLOGY | Facility: CLINIC | Age: 49
End: 2024-03-06
Payer: COMMERCIAL

## 2024-03-06 DIAGNOSIS — Z12.11 COLON CANCER SCREENING: Primary | ICD-10-CM

## 2024-03-06 RX ORDER — SODIUM PICOSULFATE, MAGNESIUM OXIDE, AND ANHYDROUS CITRIC ACID 12; 3.5; 1 G/175ML; G/175ML; MG/175ML
LIQUID ORAL
Qty: 350 ML | Refills: 0 | Status: SHIPPED | OUTPATIENT
Start: 2024-03-06 | End: 2024-04-10 | Stop reason: ALTCHOICE

## 2024-03-11 ENCOUNTER — OFFICE VISIT (OUTPATIENT)
Dept: GASTROENTEROLOGY | Facility: EXTERNAL LOCATION | Age: 49
End: 2024-03-11
Payer: COMMERCIAL

## 2024-03-11 DIAGNOSIS — K64.0 FIRST DEGREE HEMORRHOIDS: ICD-10-CM

## 2024-03-11 DIAGNOSIS — Z86.010 PERSONAL HISTORY OF COLONIC POLYPS: ICD-10-CM

## 2024-03-11 DIAGNOSIS — K28.9 GASTROJEJUNAL ULCER: Primary | ICD-10-CM

## 2024-03-11 DIAGNOSIS — Z98.84 BARIATRIC SURGERY STATUS: ICD-10-CM

## 2024-03-11 DIAGNOSIS — Z12.11 ENCOUNTER FOR SCREENING FOR MALIGNANT NEOPLASM OF COLON: ICD-10-CM

## 2024-03-11 DIAGNOSIS — R10.13 EPIGASTRIC PAIN: ICD-10-CM

## 2024-03-11 DIAGNOSIS — K62.5 RECTAL BLEEDING: ICD-10-CM

## 2024-03-11 DIAGNOSIS — K95.89 COMPLICATION OF BARIATRIC PROCEDURE: ICD-10-CM

## 2024-03-11 PROCEDURE — 0753T DGTZ GLS MCRSCP SLD LEVEL IV: CPT

## 2024-03-11 PROCEDURE — 88305 TISSUE EXAM BY PATHOLOGIST: CPT | Performed by: PATHOLOGY

## 2024-03-11 PROCEDURE — 45378 DIAGNOSTIC COLONOSCOPY: CPT | Performed by: INTERNAL MEDICINE

## 2024-03-11 PROCEDURE — 3008F BODY MASS INDEX DOCD: CPT | Performed by: INTERNAL MEDICINE

## 2024-03-11 PROCEDURE — 1036F TOBACCO NON-USER: CPT | Performed by: INTERNAL MEDICINE

## 2024-03-11 PROCEDURE — 43239 EGD BIOPSY SINGLE/MULTIPLE: CPT | Performed by: INTERNAL MEDICINE

## 2024-03-11 NOTE — PROGRESS NOTES
EGD/colonoscopy performed today 3/11/2024 at the [facility] [place] [place]  See [place] report(s) under Media tab.

## 2024-03-13 ENCOUNTER — LAB REQUISITION (OUTPATIENT)
Dept: LAB | Facility: HOSPITAL | Age: 49
End: 2024-03-13
Payer: COMMERCIAL

## 2024-03-19 LAB
LABORATORY COMMENT REPORT: NORMAL
PATH REPORT.FINAL DX SPEC: NORMAL
PATH REPORT.GROSS SPEC: NORMAL
PATH REPORT.RELEVANT HX SPEC: NORMAL
PATH REPORT.TOTAL CANCER: NORMAL

## 2024-03-19 NOTE — PROGRESS NOTES
BARIATRIC SURGERY CLINIC  1 YEAR/ANNUAL FOLLOW UP NOTE    Clinic Date:  3/20/24    Lito Unger, 48 y.o.  MRN: [id number]    Date of Surgery: 2018   Surgical Procedure: Laparoscopic lois en y gastric bypass 08639    Extra Procedures: None      Initial weight: 359 lb  Pre-surgical weight: 317 lb  Today's Visit: 224 lb  Wt Readings from Last 1 Encounters:   03/04/24 103 kg (228 lb)    There is no height or weight on file to calculate BMI.   Total weight loss: 135 lb    Surgeon: Rosy Handley MD      PROVIDER IMPRESSIONS LAST FUV 5/2022 Dr. Handley:  44 yo male post rygbp was having nausea and burning after eating. he was explored and noted to have an internal hernia. Since repair the nausea is better. He only complains of ongoing burning. He is on omeprazole. Notes meat and crackers make it worse. Avoids fish due to gout. We discussed maybe stopping tumeric. He says stopped for a month with no relief. At this point there is no evidence of actual reflux. We discussed he may have functional heartburn. We discussed trying meditation or yoga as well. He notes he also had difficult to control heartburn prior to surgery and was difficult to treat even with high dose ppi's.      We discussed trying adriana and other natual antinausea meds, and eating 3-5 times/day. Also advised GI to check his rectum due to the findings on CT. He notes so hard to find things to eat. Advised to cook meats moist. limited on fish due to his gout. At this time I see no surgical issues.   Chief Complaint     The patient is being seen for follow up. Type of surgery:. Surgery date: .   follow up appt c/o abdominal pain 3/31/22     Provider Impressions 3/11/24 Dr. Chery: EGD/colonoscopy performed today 3/11/2024 at the [facility] ([facility] [place])        Media Information                 Media Information              CT Abdomen/Pelvis 5/2022 Dr. Handley:   FINDINGS:  LOWER CHEST:  Bibasilar atelectasis.     ABDOMEN:      LIVER:  11 mm focal hypoattenuation anteriorly about the falciform ligament  probably focal fatty deposition. 8 mm low-density lesion right  hepatic lobe is too small to characterize.     BILE DUCTS:  No ductal dilatation.     GALLBLADDER:  No calcified gallstones.     SPLEEN:  Within normal limits.     PANCREAS:  Within normal limits.     ADRENAL GLANDS:  Within normal limits.     KIDNEYS AND URETERS:  No definite renal stones or hydronephrosis. Nonspecific perinephric  stranding bilaterally right greater than left.     BOWEL:  Evidence of gastric bypass surgery. There is some fluid in the  excluded stomach. Mildly prominent fluid-filled small bowel loops  with scattered air-fluid levels throughout. There is some probable  postoperative changes about the cecum. Prominent colonic stool. There  is rectal wall thickening.     PERITONEUM/RETROPERITONEUM/LYMPH NODES:  No free air.  No free fluid or focal collection.  Mildly prominent  nonspecific mesenteric nodes..     VASCULAR:  Abdominal aorta is moderately atherosclerotic without aneurysm. IVC  is grossly patent.     PELVIS:     BLADDER:  Within normal limits.     REPRODUCTIVE ORGANS:  Mildly enlarged prostate gland.     ABDOMINAL WALL:  Within normal limits.     BONES:  No suspicious osseous lesions.  Degenerative changes visualized spine.     ADDITIONAL FINDINGS:  Focal density retroareolar left breast probably due to gynecomastia  but otherwise suboptimally med in this examination.     IMPRESSION:  Evidence of gastric bypass surgery. There is some fluid in the  excluded stomach of uncertain significance. Mildly prominent  fluid-filled small bowel loops scattered throughout with some  scattered air-fluid levels could reflect nonspecific ileus/enteritis.  Clinical correlation and follow-up suggested.     Distal colonic/rectal wall thickening with inflammatory or malignant  involvement not excluded and for which close clinical correlation and  follow-up advised.      Hepatic hypoattenuating foci as described about the falciform  ligament which could reflect focal fatty deposition and also too  small to characterize low-density lesion right hepatic lobe. Consider  dedicated ultrasound for further assessment.     Focal density retroareolar region left breast probably due to  gynecomastia. Clinical correlation recommended.     Mildly enlarged prostate gland.     Additional findings as above.    PRESENTING TODAY FOR BARIATRIC POST-OP VISIT:  Visit: 7  years   -  Self Reports Concerns: EGD attempted on 3/11/24 with Dr. 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    Symptoms:    Abdominal pain:  Yes            Constipation: Yes    Diarrhea: No    Dumping Syndrome:  Yes    Experiencing hunger: No    Food Intolerances: No    Nausea/vomiting: Yes- nausea after eating    Reflux: No   Are you on medications: Yes      Diet History:     Carbonated Beverages: No          Caffeinated Beverages: Yes    Time to eat meals: 40 min    Fluid intake: 64 oz/day      Protein Intake:  40-50 grams/day    Breakfast: eggs    Lunch: doesn't eat    Dinner: bite of meat, potatoes, vegetables    Snacks: cheese, chips, nuts    EXERCISE:  No          GERD - Health Related Quality of Life Questionnaire (GERD- HRQL)  On PPI    How bad is the heartburn? 0 = No symptoms  Heartburn when lying down? 0 = No symptoms  Heartburn when standing up? 0 = No symptoms  Heartburn after meals? 0 = No symptoms  Does heartburn change your diet? 0 = No symptoms  Does heartburn wake you from sleep? 0 = No symptoms    Do you have difficulty swallowing? 0 = No symptoms  Do you have pain with swallowing? 0 = No symptoms  If you take medication, does this affect your daily life? 0 = No symptoms    How bad is the regurgitation? 0 = No symptoms  Regurgitation when lying down? 0 = No symptoms  Regurgitation when standing up? 0 = No symptoms  Regurgitation after meals? 0 = No  symptoms  Does regurgitation change your diet? 0 = No symptoms  Does regurgitation wake you from sleep? 0 = No symptoms    How satisfied are you with your present condition? Satisfied    Total score (calculated by summing the individual scores of questions 1-15): 0   Greatest possible score 75 (worst symptoms).   Lowest possible score 0 (no symptoms).    Heartburn score (calculated by summing the individual scores of questions 1-6): 0   Worst heartburn symptoms: 30.   No heartburn symptoms: 0.   Score less than or equal to 12 with each individual question not exceeding 2 indicate heartburn elimination.    Regurgitation score (calculated by summing the individual scores of questions 10-15): 0   Worst regurgitation symptoms: 30.   No regurgitation symptoms: 0.   Score less than or equal to 12 with each individual question not exceeding 2 indicate regurgitation elimination.     Last Visit:    Wt Readings from Last 3 Encounters:   03/04/24 103 kg (228 lb)   01/26/24 102 kg (225 lb)   10/26/23 104 kg (230 lb)        HPI: s/p rygb 2018 with epigastric abd pain and nausea after eating. EGD with Dr. Chery last Monday, unable to be performed d/t to stenosis, has bleeding ulcer.     Current Outpatient Medications   Medication Sig Dispense Refill    allopurinol (Zyloprim) 100 mg tablet Take 4 tablets (400 mg) by mouth once daily. 360 tablet 3    cyclobenzaprine (Fexmid) 7.5 mg tablet Take 1 tablet (7.5 mg) by mouth 3 times a day as needed for muscle spasms. 60 tablet 2    diclofenac sodium 1 % kit Apply 4 g topically every 12 hours if needed (Pain).      famotidine (Pepcid) 40 mg tablet Take 1 tablet (40 mg) by mouth 2 times a day. 180 tablet 3    fluticasone (Flonase) 50 mcg/actuation nasal spray Administer 1 spray into each nostril once daily. Shake gently. Before first use, prime pump. After use, clean tip and replace cap. 16 g 11    hydrocortisone (Anusol-HC) 25 mg suppository Insert 1 suppository (25 mg) into the  rectum 2 times a day as needed for hemorrhoids. 30 suppository 5    lisinopril 10 mg tablet Take 1 tablet (10 mg) by mouth once daily. 30 tablet 1    metaxalone (Skelaxin) 800 mg tablet Take 1 tablet (800 mg) by mouth 2 times a day. 60 tablet 3    naloxone (Narcan) 4 mg/0.1 mL nasal spray Administer 1 spray (4 mg) into affected nostril(s) if needed for opioid reversal. 2 each 0    omeprazole (PriLOSEC) 40 mg DR capsule Take 1 capsule (40 mg) by mouth once daily in the morning. Take before meals.      oxyCODONE-acetaminophen (Percocet) 5-325 mg tablet Take 1-2 tablets by mouth every 6 hours if needed for severe pain (7 - 10). 60 tablet 0    sod picosulf-mag ox-citric ac (Clenpiq) 10 mg-3.5 gram- 12 gram/175 mL solution Take one bottle twice as directed by the prep instructions 350 mL 0     No current facility-administered medications for this visit.       Comorbidities:  No problem-specific Assessment & Plan notes found for this encounter.      DAILY SUPPLEMENTS:  Calcium: Calcium Citrate w/ vitamin D (1200 - 1500mg)  Multivitamin & Minerals: 2 per day  Iron Supplement: included in multi-vitamin  Vitamin B12: 1000 mcg   Vitamin D3: 3000 units      REVIEW OF SYSTEMS:  CONSTITUTIONAL: Patient denies fevers, chills, sweats and weight changes.  EYES: Patient denies any visual symptoms.  EARS, NOSE, AND THROAT: No difficulties with hearing. No symptoms of rhinitis or sore throat.  CARDIOVASCULAR: Patient denies chest pains, palpitations, orthopnea and paroxysmal nocturnal dyspnea.  RESPIRATORY: No dyspnea on exertion, no wheezing or cough.  GI: No nausea, vomiting, diarrhea, constipation, abdominal pain, hematochezia or melena.  : No urinary hesitancy or dribbling. No nocturia or urinary frequency. No abnormal urethral discharge.  MUSCULOSKELETAL: No myalgias or arthralgias.  NEUROLOGIC: No chronic headaches, no seizures. Patient denies numbness, tingling or weakness.  PSYCHIATRIC: Patient denies problems with mood  disturbance. No problems with anxiety.  ENDOCRINE: No excessive urination or excessive thirst.  DERMATOLOGIC: Patient denies any rashes or skin changes.    PHYSICAL EXAM:  There were no vitals taken for this visit. There is no height or weight on file to calculate BMI.  GENERAL: Obese. No apparent distress. Pt is alert and oriented x3.  HEENT: Head is normocephalic and atraumatic. Extraocular muscles are intact. Pupils are equal, round, and reactive to light and accommodation. Nares appeared normal. Mouth is well hydrated and without lesions. Mucous membranes are moist. Posterior pharynx clear of any exudate or lesions.  NECK: Supple. No carotid bruits. No lymphadenopathy or thyromegaly.  LUNGS: Clear to auscultation.  HEART: Regular rate and rhythm without murmur.  ABDOMEN: Soft, nontender, and nondistended. Positive bowel sounds. No hepatosplenomegaly was noted.  EXTREMITIES: Without any cyanosis, clubbing, rash, lesions or edema.  NEUROLOGIC: Cranial nerves II through XII are grossly intact.  PSYCHIATRIC: Flat affect, but denies suicidal or homicidal ideations.  SKIN: No ulceration or induration present.      TODAY'S PROVIDER VISIT IMPRESSIONS:  Patient not seen today    Dr. Rosy Handley M.D., MPH  Director of Bariatric & Minimally Invasive Surgery  [place] [address]  T:  [phone]  F:  [phone]     Shelby Freedman, RN Assistant Nurse Manager, Care Coordinator Patient Nursing Contact  T: [phone]  F: [phone]    Esther Campuzano Patient Navigator- Bariatric Surgery [facility] Patient Clearance Questions & Tracking Contact  T: [phone] F: [phone]

## 2024-03-20 ENCOUNTER — TELEMEDICINE (OUTPATIENT)
Dept: SURGERY | Facility: CLINIC | Age: 49
End: 2024-03-20
Payer: COMMERCIAL

## 2024-03-20 DIAGNOSIS — Z13.21 ENCOUNTER FOR VITAMIN DEFICIENCY SCREENING: ICD-10-CM

## 2024-03-20 DIAGNOSIS — Z98.84 BARIATRIC SURGERY STATUS: ICD-10-CM

## 2024-03-20 DIAGNOSIS — Z98.84 STATUS POST BARIATRIC SURGERY: ICD-10-CM

## 2024-03-20 PROCEDURE — 99212 OFFICE O/P EST SF 10 MIN: CPT | Mod: 95 | Performed by: SURGERY

## 2024-03-20 PROCEDURE — 3008F BODY MASS INDEX DOCD: CPT | Performed by: SURGERY

## 2024-03-20 PROCEDURE — 99212 OFFICE O/P EST SF 10 MIN: CPT | Performed by: SURGERY

## 2024-03-21 NOTE — PROGRESS NOTES
Date of Surgery: 2018   Surgical Procedure: Laparoscopic lois en y gastric bypass 16913  Extra Procedures: None      Initial weight: 359 lb  Pre-surgical weight: 317 lb  Today's Visit: 224 lb      Wt Readings from Last 1 Encounters:   03/04/24 103 kg (228 lb)    There is no height or weight on file to calculate BMI.   Total weight loss: 135 lb     Surgeon: Rosy Handley MD   Today notes a lot of stomach pain.  Had an [facility] endoscopy: GI told patient in the pouch, outlet very tight.  Too much scar.  He saw an ulcer there.  Takes the omeprazole and if gets more heartburn than usual uses pepcid.  Most problem is trouble with eating. Not usung any nsaid's.  Not smoking or vaping.  Went through some time for a year with no heartburn.  Still could not eat.  He feels nausea since the surgery. Pain in stomach.      PROVIDER IMPRESSIONS LAST FUV 5/2022 Dr. Handley:  44 yo male post rygbp was having nausea and burning after eating. he was explored and noted to have an internal hernia. Since repair the nausea is better. He only complains of ongoing burning. He is on omeprazole. Notes meat and crackers make it worse. Avoids fish due to gout. We discussed maybe stopping tumeric. He says stopped for a month with no relief. At this point there is no evidence of actual reflux. We discussed he may have functional heartburn. We discussed trying meditation or yoga as well. He notes he also had difficult to control heartburn prior to surgery and was difficult to treat even with high dose ppi's.      We discussed trying adriana and other natual antinausea meds, and eating 3-5 times/day. Also advised GI to check his rectum due to the findings on CT. He notes so hard to find things to eat. Advised to cook meats moist. limited on fish due to his gout. At this time I see no surgical issues.   Chief Complaint  The patient is being seen for follow up. Type of surgery:. Surgery date: .   follow up appt c/o abdominal pain 3/31/22      Provider  Impressions 3/11/24 Dr. Chery: EGD/colonoscopy performed today 3/11/2024 at the [facility] ([facility] [place])         Media Information                  Media Information              FINAL DIAGNOSIS   A. STOMACH ANTRUM BIOPSY:   Mild reactive gastritis/gastropathy  No H. pylori organisms are identified         CT Abdomen/Pelvis 5/2022 Dr. Handley:   FINDINGS:  LOWER CHEST:  Bibasilar atelectasis.     ABDOMEN:     LIVER:  11 mm focal hypoattenuation anteriorly about the falciform ligament  probably focal fatty deposition. 8 mm low-density lesion right  hepatic lobe is too small to characterize.     BILE DUCTS:  No ductal dilatation.     GALLBLADDER:  No calcified gallstones.     SPLEEN:  Within normal limits.     PANCREAS:  Within normal limits.     ADRENAL GLANDS:  Within normal limits.     KIDNEYS AND URETERS:  No definite renal stones or hydronephrosis. Nonspecific perinephric  stranding bilaterally right greater than left.     BOWEL:  Evidence of gastric bypass surgery. There is some fluid in the  excluded stomach. Mildly prominent fluid-filled small bowel loops  with scattered air-fluid levels throughout. There is some probable  postoperative changes about the cecum. Prominent colonic stool. There  is rectal wall thickening.     PERITONEUM/RETROPERITONEUM/LYMPH NODES:  No free air.  No free fluid or focal collection.  Mildly prominent  nonspecific mesenteric nodes..     VASCULAR:  Abdominal aorta is moderately atherosclerotic without aneurysm. IVC  is grossly patent.     PELVIS:     BLADDER:  Within normal limits.     REPRODUCTIVE ORGANS:  Mildly enlarged prostate gland.     ABDOMINAL WALL:  Within normal limits.     BONES:  No suspicious osseous lesions.  Degenerative changes visualized spine.     ADDITIONAL FINDINGS:  Focal density retroareolar left breast probably due to gynecomastia  but otherwise suboptimally med in this examination.     IMPRESSION:  Evidence of gastric bypass surgery.  There is some fluid in the  excluded stomach of uncertain significance. Mildly prominent  fluid-filled small bowel loops scattered throughout with some  scattered air-fluid levels could reflect nonspecific ileus/enteritis.  Clinical correlation and follow-up suggested.     Distal colonic/rectal wall thickening with inflammatory or malignant  involvement not excluded and for which close clinical correlation and  follow-up advised.     Hepatic hypoattenuating foci as described about the falciform  ligament which could reflect focal fatty deposition and also too  small to characterize low-density lesion right hepatic lobe. Consider  dedicated ultrasound for further assessment.     Focal density retroareolar region left breast probably due to  gynecomastia. Clinical correlation recommended.     Mildly enlarged prostate gland.     Additional findings as above.     PRESENTING TODAY FOR BARIATRIC POST-OP VISIT:  Visit: 7  years   -  Self Reports Concerns: EGD attempted on 3/11/24 with Dr. 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    Symptoms:    Abdominal pain:  Yes            Constipation: Yes    Diarrhea: No    Dumping Syndrome:  Yes    Experiencing hunger: No    Food Intolerances: No    Nausea/vomiting: Yes- nausea after eating    Reflux: No   Are you on medications: Yes                            Diet History:     Carbonated Beverages: No          Caffeinated Beverages: Yes    Time to eat meals: 40 min    Fluid intake: 64 oz/day      Protein Intake:  40-50 grams/day    Breakfast: eggs    Lunch: doesn't eat    Dinner: bite of meat, potatoes, vegetables    Snacks: cheese, chips, nuts     EXERCISE:  No           GERD - Health Related Quality of Life Questionnaire (GERD- HRQL)  On PPI     How bad is the heartburn? 0 = No symptoms  Heartburn when lying down? 0 = No symptoms  Heartburn when standing up? 0 = No symptoms  Heartburn after meals? 0 = No symptoms  Does  heartburn change your diet? 0 = No symptoms  Does heartburn wake you from sleep? 0 = No symptoms     Do you have difficulty swallowing? 0 = No symptoms  Do you have pain with swallowing? 0 = No symptoms  If you take medication, does this affect your daily life? 0 = No symptoms     How bad is the regurgitation? 0 = No symptoms  Regurgitation when lying down? 0 = No symptoms  Regurgitation when standing up? 0 = No symptoms  Regurgitation after meals? 0 = No symptoms  Does regurgitation change your diet? 0 = No symptoms  Does regurgitation wake you from sleep? 0 = No symptoms     How satisfied are you with your present condition? Satisfied     Total score (calculated by summing the individual scores of questions 1-15): 0              Greatest possible score 75 (worst symptoms).              Lowest possible score 0 (no symptoms).     Heartburn score (calculated by summing the individual scores of questions 1-6): 0              Worst heartburn symptoms: 30.              No heartburn symptoms: 0.              Score less than or equal to 12 with each individual question not exceeding 2 indicate heartburn elimination.     Regurgitation score (calculated by summing the individual scores of questions 10-15): 0              Worst regurgitation symptoms: 30.              No regurgitation symptoms: 0.              Score less than or equal to 12 with each individual question not exceeding 2 indicate regurgitation elimination.      Last Visit:        Wt Readings from Last 3 Encounters:   03/04/24 103 kg (228 lb)   01/26/24 102 kg (225 lb)   10/26/23 104 kg (230 lb)             HPI: s/p rygb 2018 with epigastric abd pain and nausea after eating. EGD with Dr. Chery last Monday, unable to be performed d/t to stenosis, has bleeding ulcer.      Current Medications          Current Outpatient Medications   Medication Sig Dispense Refill    allopurinol (Zyloprim) 100 mg tablet Take 4 tablets (400 mg) by mouth once daily. 360 tablet 3     cyclobenzaprine (Fexmid) 7.5 mg tablet Take 1 tablet (7.5 mg) by mouth 3 times a day as needed for muscle spasms. 60 tablet 2    diclofenac sodium 1 % kit Apply 4 g topically every 12 hours if needed (Pain).        famotidine (Pepcid) 40 mg tablet Take 1 tablet (40 mg) by mouth 2 times a day. 180 tablet 3    fluticasone (Flonase) 50 mcg/actuation nasal spray Administer 1 spray into each nostril once daily. Shake gently. Before first use, prime pump. After use, clean tip and replace cap. 16 g 11    hydrocortisone (Anusol-HC) 25 mg suppository Insert 1 suppository (25 mg) into the rectum 2 times a day as needed for hemorrhoids. 30 suppository 5    lisinopril 10 mg tablet Take 1 tablet (10 mg) by mouth once daily. 30 tablet 1    metaxalone (Skelaxin) 800 mg tablet Take 1 tablet (800 mg) by mouth 2 times a day. 60 tablet 3    naloxone (Narcan) 4 mg/0.1 mL nasal spray Administer 1 spray (4 mg) into affected nostril(s) if needed for opioid reversal. 2 each 0    omeprazole (PriLOSEC) 40 mg DR capsule Take 1 capsule (40 mg) by mouth once daily in the morning. Take before meals.        oxyCODONE-acetaminophen (Percocet) 5-325 mg tablet Take 1-2 tablets by mouth every 6 hours if needed for severe pain (7 - 10). 60 tablet 0    sod picosulf-mag ox-citric ac (Clenpiq) 10 mg-3.5 gram- 12 gram/175 mL solution Take one bottle twice as directed by the prep instructions 350 mL 0      No current facility-administered medications for this visit.            Comorbidities:  No problem-specific Assessment & Plan notes found for this encounter.        DAILY SUPPLEMENTS:  Calcium: Calcium Citrate w/ vitamin D (1200 - 1500mg)  Multivitamin & Minerals: 2 per day  Iron Supplement: included in multi-vitamin  Vitamin B12: 1000 mcg   Vitamin D3: 3000 units       REVIEW OF SYSTEMS:  CONSTITUTIONAL: Patient denies fevers, chills, sweats and weight changes.  EYES: Patient denies any visual symptoms.  EARS, NOSE, AND THROAT: No difficulties with  hearing. No symptoms of rhinitis or sore throat.  CARDIOVASCULAR: Patient denies chest pains, palpitations, orthopnea and paroxysmal nocturnal dyspnea.  RESPIRATORY: No dyspnea on exertion, no wheezing or cough.  GI: No nausea, vomiting, diarrhea, constipation, abdominal pain, hematochezia or melena.  : No urinary hesitancy or dribbling. No nocturia or urinary frequency. No abnormal urethral discharge.  MUSCULOSKELETAL: No myalgias or arthralgias.  NEUROLOGIC: No chronic headaches, no seizures. Patient denies numbness, tingling or weakness.  PSYCHIATRIC: Patient denies problems with mood disturbance. No problems with anxiety.  ENDOCRINE: No excessive urination or excessive thirst.  DERMATOLOGIC: Patient denies any rashes or skin changes.     PHYSICAL EXAM:  There were no vitals taken for this visit. There is no height or weight on file to calculate BMI.  GENERAL: Obese. No apparent distress. Pt is alert and oriented x3.  HEENT: Head is normocephalic and atraumatic. Extraocular muscles are intact. Pupils are equal, round, and reactive to light and accommodation. Nares appeared normal. Mouth is well hydrated and without lesions. Mucous membranes are moist. Posterior pharynx clear of any exudate or lesions.  NECK: Supple. No carotid bruits. No lymphadenopathy or thyromegaly.  LUNGS: Clear to auscultation.  HEART: Regular rate and rhythm without murmur.  ABDOMEN: Soft, nontender, and nondistended. Positive bowel sounds. No hepatosplenomegaly was noted.  EXTREMITIES: Without any cyanosis, clubbing, rash, lesions or edema.  NEUROLOGIC: Cranial nerves II through XII are grossly intact.  PSYCHIATRIC: Flat affect, but denies suicidal or homicidal ideations.  SKIN: No ulceration or induration present.        TODAY'S PROVIDER VISIT IMPRESSIONS:46 yo male post rygbp was having nausea and burning after eating. he was explored and noted to have an internal hernia. Since repair the nausea is better. He only complains of ongoing  burning. He is on omeprazole. Notes meat and crackers make it worse. Avoids fish due to gout. We discussed maybe stopping tumeric. He says stopped for a month with no relief. At this point there is no evidence of actual reflux. We discussed he may have functional heartburn. We discussed trying meditation or yoga as well. He notes he also had difficult to control heartburn prior to surgery and was difficult to treat even with high dose ppi's.      We discussed trying adriana and other natual antinausea meds, and eating 3-5 times/day. Also advised GI to check his rectum due to the findings on CT. He notes so hard to find things to eat. Advised to cook meats moist. limited on fish due to his gout. At this time I see no surgical issues.   I last saw him in 2022 and last 2021 was egd.  All normal at that time.  Most recently ongoing pain with eating. Egd done by gi shows ulceration and stricture at GJ.  Denies nsaid's and smoking and vaping.   Will add carafate.  Med list reviewed,  He is not able to eat meat.  Pain with eating all of the time.  Will see how egd is before considering next steps.        A/P:   Take carafate 4 times/day  Make the pills into a slurry.  I will scope you in a month, with a possible dilation.     Follow the pouch rules:    - - Eat 5 small meals per day (3 meals and 2 snacks)  - Take in at least 60 g protein daily (try to get through lean meats, dairy, legumes)  - Eat 5 vegetables per day  - No sweets, fruits are fine.  Berries and citrus are lower glycemic index fruits  -Limit rice, bread, pasta and potatoes.  These should only be consumed after having your protein and vegetables  - Drink at least 60 oz fluid daily, (non caffeinated, no carbonated beverages, sugar free)  - Get 60 minutes of exercise daily           Please have your yearly lab work done (if you have not already) - We will call you with any abnormal lab results.     Be sure to continue with exercise, goal should be 3-5 times a  week 60 minutes at a time.     Increase variety and intensity of your work out routine.     Make sure you are taking your multivitamin, B12 and calcium.     See the Dietician as needed.     Dr. Rosy Handley M.D., MPH  Director of Bariatric & Minimally Invasive Surgery  [place] [address]  T:  [phone]  F:  [phone]      Shelby Freedman, RN Assistant Nurse Manager, Care Coordinator Patient Nursing Contact  T: [phone]  F: [phone]     Esther Campuzano Patient Navigator- Bariatric Surgery [facility] Patient Clearance Questions & Tracking Contact  T: [phone] F: [phone]                                  Telemedicine on 3/20/2024            Revision History       Additional Documentation    Flowsheets: Interfaced Flowsheet Data   Encounter Info: Billing Info,     History,     Allergies

## 2024-03-21 NOTE — H&P (VIEW-ONLY)
Date of Surgery: 2018   Surgical Procedure: Laparoscopic lois en y gastric bypass 08209  Extra Procedures: None      Initial weight: 359 lb  Pre-surgical weight: 317 lb  Today's Visit: 224 lb      Wt Readings from Last 1 Encounters:   03/04/24 103 kg (228 lb)    There is no height or weight on file to calculate BMI.   Total weight loss: 135 lb     Surgeon: Rosy Handley MD   Today notes a lot of stomach pain.  Had an [facility] endoscopy: GI told patient in the pouch, outlet very tight.  Too much scar.  He saw an ulcer there.  Takes the omeprazole and if gets more heartburn than usual uses pepcid.  Most problem is trouble with eating. Not usung any nsaid's.  Not smoking or vaping.  Went through some time for a year with no heartburn.  Still could not eat.  He feels nausea since the surgery. Pain in stomach.      PROVIDER IMPRESSIONS LAST FUV 5/2022 Dr. Handley:  46 yo male post rygbp was having nausea and burning after eating. he was explored and noted to have an internal hernia. Since repair the nausea is better. He only complains of ongoing burning. He is on omeprazole. Notes meat and crackers make it worse. Avoids fish due to gout. We discussed maybe stopping tumeric. He says stopped for a month with no relief. At this point there is no evidence of actual reflux. We discussed he may have functional heartburn. We discussed trying meditation or yoga as well. He notes he also had difficult to control heartburn prior to surgery and was difficult to treat even with high dose ppi's.      We discussed trying adriana and other natual antinausea meds, and eating 3-5 times/day. Also advised GI to check his rectum due to the findings on CT. He notes so hard to find things to eat. Advised to cook meats moist. limited on fish due to his gout. At this time I see no surgical issues.   Chief Complaint  The patient is being seen for follow up. Type of surgery:. Surgery date: .   follow up appt c/o abdominal pain 3/31/22      Provider  Impressions 3/11/24 Dr. Chery: EGD/colonoscopy performed today 3/11/2024 at the [facility] ([facility])         Media Information                  Media Information              FINAL DIAGNOSIS   A. STOMACH ANTRUM BIOPSY:   Mild reactive gastritis/gastropathy  No H. pylori organisms are identified         CT Abdomen/Pelvis 5/2022 Dr. Handley:   FINDINGS:  LOWER CHEST:  Bibasilar atelectasis.     ABDOMEN:     LIVER:  11 mm focal hypoattenuation anteriorly about the falciform ligament  probably focal fatty deposition. 8 mm low-density lesion right  hepatic lobe is too small to characterize.     BILE DUCTS:  No ductal dilatation.     GALLBLADDER:  No calcified gallstones.     SPLEEN:  Within normal limits.     PANCREAS:  Within normal limits.     ADRENAL GLANDS:  Within normal limits.     KIDNEYS AND URETERS:  No definite renal stones or hydronephrosis. Nonspecific perinephric  stranding bilaterally right greater than left.     BOWEL:  Evidence of gastric bypass surgery. There is some fluid in the  excluded stomach. Mildly prominent fluid-filled small bowel loops  with scattered air-fluid levels throughout. There is some probable  postoperative changes about the cecum. Prominent colonic stool. There  is rectal wall thickening.     PERITONEUM/RETROPERITONEUM/LYMPH NODES:  No free air.  No free fluid or focal collection.  Mildly prominent  nonspecific mesenteric nodes..     VASCULAR:  Abdominal aorta is moderately atherosclerotic without aneurysm. IVC  is grossly patent.     PELVIS:     BLADDER:  Within normal limits.     REPRODUCTIVE ORGANS:  Mildly enlarged prostate gland.     ABDOMINAL WALL:  Within normal limits.     BONES:  No suspicious osseous lesions.  Degenerative changes visualized spine.     ADDITIONAL FINDINGS:  Focal density retroareolar left breast probably due to gynecomastia  but otherwise suboptimally med in this examination.     IMPRESSION:  Evidence of gastric bypass surgery.  There is some fluid in the  excluded stomach of uncertain significance. Mildly prominent  fluid-filled small bowel loops scattered throughout with some  scattered air-fluid levels could reflect nonspecific ileus/enteritis.  Clinical correlation and follow-up suggested.     Distal colonic/rectal wall thickening with inflammatory or malignant  involvement not excluded and for which close clinical correlation and  follow-up advised.     Hepatic hypoattenuating foci as described about the falciform  ligament which could reflect focal fatty deposition and also too  small to characterize low-density lesion right hepatic lobe. Consider  dedicated ultrasound for further assessment.     Focal density retroareolar region left breast probably due to  gynecomastia. Clinical correlation recommended.     Mildly enlarged prostate gland.     Additional findings as above.     PRESENTING TODAY FOR BARIATRIC POST-OP VISIT:  Visit: 7  years   -  Self Reports Concerns: EGD attempted on 3/11/24 with Dr. 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    Symptoms:    Abdominal pain:  Yes            Constipation: Yes    Diarrhea: No    Dumping Syndrome:  Yes    Experiencing hunger: No    Food Intolerances: No    Nausea/vomiting: Yes- nausea after eating    Reflux: No   Are you on medications: Yes                            Diet History:     Carbonated Beverages: No          Caffeinated Beverages: Yes    Time to eat meals: 40 min    Fluid intake: 64 oz/day      Protein Intake:  40-50 grams/day    Breakfast: eggs    Lunch: doesn't eat    Dinner: bite of meat, potatoes, vegetables    Snacks: cheese, chips, nuts     EXERCISE:  No           GERD - Health Related Quality of Life Questionnaire (GERD- HRQL)  On PPI     How bad is the heartburn? 0 = No symptoms  Heartburn when lying down? 0 = No symptoms  Heartburn when standing up? 0 = No symptoms  Heartburn after meals? 0 = No symptoms  Does  heartburn change your diet? 0 = No symptoms  Does heartburn wake you from sleep? 0 = No symptoms     Do you have difficulty swallowing? 0 = No symptoms  Do you have pain with swallowing? 0 = No symptoms  If you take medication, does this affect your daily life? 0 = No symptoms     How bad is the regurgitation? 0 = No symptoms  Regurgitation when lying down? 0 = No symptoms  Regurgitation when standing up? 0 = No symptoms  Regurgitation after meals? 0 = No symptoms  Does regurgitation change your diet? 0 = No symptoms  Does regurgitation wake you from sleep? 0 = No symptoms     How satisfied are you with your present condition? Satisfied     Total score (calculated by summing the individual scores of questions 1-15): 0              Greatest possible score 75 (worst symptoms).              Lowest possible score 0 (no symptoms).     Heartburn score (calculated by summing the individual scores of questions 1-6): 0              Worst heartburn symptoms: 30.              No heartburn symptoms: 0.              Score less than or equal to 12 with each individual question not exceeding 2 indicate heartburn elimination.     Regurgitation score (calculated by summing the individual scores of questions 10-15): 0              Worst regurgitation symptoms: 30.              No regurgitation symptoms: 0.              Score less than or equal to 12 with each individual question not exceeding 2 indicate regurgitation elimination.      Last Visit:        Wt Readings from Last 3 Encounters:   03/04/24 103 kg (228 lb)   01/26/24 102 kg (225 lb)   10/26/23 104 kg (230 lb)             HPI: s/p rygb 2018 with epigastric abd pain and nausea after eating. EGD with Dr. Chery last Monday, unable to be performed d/t to stenosis, has bleeding ulcer.      Current Medications          Current Outpatient Medications   Medication Sig Dispense Refill    allopurinol (Zyloprim) 100 mg tablet Take 4 tablets (400 mg) by mouth once daily. 360 tablet 3     cyclobenzaprine (Fexmid) 7.5 mg tablet Take 1 tablet (7.5 mg) by mouth 3 times a day as needed for muscle spasms. 60 tablet 2    diclofenac sodium 1 % kit Apply 4 g topically every 12 hours if needed (Pain).        famotidine (Pepcid) 40 mg tablet Take 1 tablet (40 mg) by mouth 2 times a day. 180 tablet 3    fluticasone (Flonase) 50 mcg/actuation nasal spray Administer 1 spray into each nostril once daily. Shake gently. Before first use, prime pump. After use, clean tip and replace cap. 16 g 11    hydrocortisone (Anusol-HC) 25 mg suppository Insert 1 suppository (25 mg) into the rectum 2 times a day as needed for hemorrhoids. 30 suppository 5    lisinopril 10 mg tablet Take 1 tablet (10 mg) by mouth once daily. 30 tablet 1    metaxalone (Skelaxin) 800 mg tablet Take 1 tablet (800 mg) by mouth 2 times a day. 60 tablet 3    naloxone (Narcan) 4 mg/0.1 mL nasal spray Administer 1 spray (4 mg) into affected nostril(s) if needed for opioid reversal. 2 each 0    omeprazole (PriLOSEC) 40 mg DR capsule Take 1 capsule (40 mg) by mouth once daily in the morning. Take before meals.        oxyCODONE-acetaminophen (Percocet) 5-325 mg tablet Take 1-2 tablets by mouth every 6 hours if needed for severe pain (7 - 10). 60 tablet 0    sod picosulf-mag ox-citric ac (Clenpiq) 10 mg-3.5 gram- 12 gram/175 mL solution Take one bottle twice as directed by the prep instructions 350 mL 0      No current facility-administered medications for this visit.            Comorbidities:  No problem-specific Assessment & Plan notes found for this encounter.        DAILY SUPPLEMENTS:  Calcium: Calcium Citrate w/ vitamin D (1200 - 1500mg)  Multivitamin & Minerals: 2 per day  Iron Supplement: included in multi-vitamin  Vitamin B12: 1000 mcg   Vitamin D3: 3000 units       REVIEW OF SYSTEMS:  CONSTITUTIONAL: Patient denies fevers, chills, sweats and weight changes.  EYES: Patient denies any visual symptoms.  EARS, NOSE, AND THROAT: No difficulties with  hearing. No symptoms of rhinitis or sore throat.  CARDIOVASCULAR: Patient denies chest pains, palpitations, orthopnea and paroxysmal nocturnal dyspnea.  RESPIRATORY: No dyspnea on exertion, no wheezing or cough.  GI: No nausea, vomiting, diarrhea, constipation, abdominal pain, hematochezia or melena.  : No urinary hesitancy or dribbling. No nocturia or urinary frequency. No abnormal urethral discharge.  MUSCULOSKELETAL: No myalgias or arthralgias.  NEUROLOGIC: No chronic headaches, no seizures. Patient denies numbness, tingling or weakness.  PSYCHIATRIC: Patient denies problems with mood disturbance. No problems with anxiety.  ENDOCRINE: No excessive urination or excessive thirst.  DERMATOLOGIC: Patient denies any rashes or skin changes.     PHYSICAL EXAM:  There were no vitals taken for this visit. There is no height or weight on file to calculate BMI.  GENERAL: Obese. No apparent distress. Pt is alert and oriented x3.  HEENT: Head is normocephalic and atraumatic. Extraocular muscles are intact. Pupils are equal, round, and reactive to light and accommodation. Nares appeared normal. Mouth is well hydrated and without lesions. Mucous membranes are moist. Posterior pharynx clear of any exudate or lesions.  NECK: Supple. No carotid bruits. No lymphadenopathy or thyromegaly.  LUNGS: Clear to auscultation.  HEART: Regular rate and rhythm without murmur.  ABDOMEN: Soft, nontender, and nondistended. Positive bowel sounds. No hepatosplenomegaly was noted.  EXTREMITIES: Without any cyanosis, clubbing, rash, lesions or edema.  NEUROLOGIC: Cranial nerves II through XII are grossly intact.  PSYCHIATRIC: Flat affect, but denies suicidal or homicidal ideations.  SKIN: No ulceration or induration present.        TODAY'S PROVIDER VISIT IMPRESSIONS:44 yo male post rygbp was having nausea and burning after eating. he was explored and noted to have an internal hernia. Since repair the nausea is better. He only complains of ongoing  burning. He is on omeprazole. Notes meat and crackers make it worse. Avoids fish due to gout. We discussed maybe stopping tumeric. He says stopped for a month with no relief. At this point there is no evidence of actual reflux. We discussed he may have functional heartburn. We discussed trying meditation or yoga as well. He notes he also had difficult to control heartburn prior to surgery and was difficult to treat even with high dose ppi's.      We discussed trying adriana and other natual antinausea meds, and eating 3-5 times/day. Also advised GI to check his rectum due to the findings on CT. He notes so hard to find things to eat. Advised to cook meats moist. limited on fish due to his gout. At this time I see no surgical issues.   I last saw him in 2022 and last 2021 was egd.  All normal at that time.  Most recently ongoing pain with eating. Egd done by gi shows ulceration and stricture at GJ.  Denies nsaid's and smoking and vaping.   Will add carafate.  Med list reviewed,  He is not able to eat meat.  Pain with eating all of the time.  Will see how egd is before considering next steps.        A/P:   Take carafate 4 times/day  Make the pills into a slurry.  I will scope you in a month, with a possible dilation.     Follow the pouch rules:    - - Eat 5 small meals per day (3 meals and 2 snacks)  - Take in at least 60 g protein daily (try to get through lean meats, dairy, legumes)  - Eat 5 vegetables per day  - No sweets, fruits are fine.  Berries and citrus are lower glycemic index fruits  -Limit rice, bread, pasta and potatoes.  These should only be consumed after having your protein and vegetables  - Drink at least 60 oz fluid daily, (non caffeinated, no carbonated beverages, sugar free)  - Get 60 minutes of exercise daily           Please have your yearly lab work done (if you have not already) - We will call you with any abnormal lab results.     Be sure to continue with exercise, goal should be 3-5 times a  week 60 minutes at a time.     Increase variety and intensity of your work out routine.     Make sure you are taking your multivitamin, B12 and calcium.     See the Dietician as needed.     Dr. Rosy Handley M.D., MPH  Director of Bariatric & Minimally Invasive Surgery  [place] [address]  T:  [phone]  F:  [phone]      Shelby Freedman, RN Assistant Nurse Manager, Care Coordinator Patient Nursing Contact  T: [phone]  F: [phone]     Esther Campuzano Patient Navigator- Bariatric Surgery [facility] Patient Clearance Questions & Tracking Contact  T: [phone] F: [phone]                                  Telemedicine on 3/20/2024            Revision History       Additional Documentation    Flowsheets: Interfaced Flowsheet Data   Encounter Info: Billing Info,     History,     Allergies

## 2024-03-26 DIAGNOSIS — M47.816 FACET DEGENERATION OF LUMBAR REGION: ICD-10-CM

## 2024-03-26 DIAGNOSIS — M51.36 DISCOGENIC LOW BACK PAIN: ICD-10-CM

## 2024-03-26 DIAGNOSIS — G89.29 OTHER CHRONIC PAIN: ICD-10-CM

## 2024-03-26 RX ORDER — OXYCODONE AND ACETAMINOPHEN 5; 325 MG/1; MG/1
1-2 TABLET ORAL EVERY 6 HOURS PRN
Qty: 60 TABLET | Refills: 0 | Status: SHIPPED | OUTPATIENT
Start: 2024-03-26 | End: 2024-04-25 | Stop reason: SDUPTHER

## 2024-04-02 PROBLEM — Z98.84 BARIATRIC SURGERY STATUS: Status: ACTIVE | Noted: 2024-04-02

## 2024-04-02 PROBLEM — Z13.21 ENCOUNTER FOR VITAMIN DEFICIENCY SCREENING: Status: ACTIVE | Noted: 2024-04-02

## 2024-04-02 PROBLEM — Z98.84 STATUS POST BARIATRIC SURGERY: Status: ACTIVE | Noted: 2024-04-02

## 2024-04-10 ENCOUNTER — OFFICE VISIT (OUTPATIENT)
Dept: SURGERY | Facility: CLINIC | Age: 49
End: 2024-04-10
Payer: COMMERCIAL

## 2024-04-10 VITALS
BODY MASS INDEX: 29.16 KG/M2 | SYSTOLIC BLOOD PRESSURE: 156 MMHG | HEART RATE: 78 BPM | DIASTOLIC BLOOD PRESSURE: 86 MMHG | WEIGHT: 221 LBS

## 2024-04-10 DIAGNOSIS — G89.29 CHRONIC RIGHT UPPER QUADRANT PAIN: ICD-10-CM

## 2024-04-10 DIAGNOSIS — E88.810 DYSMETABOLIC SYNDROME: ICD-10-CM

## 2024-04-10 DIAGNOSIS — Z13.21 ENCOUNTER FOR VITAMIN DEFICIENCY SCREENING: ICD-10-CM

## 2024-04-10 DIAGNOSIS — E55.9 VITAMIN D DEFICIENCY: ICD-10-CM

## 2024-04-10 DIAGNOSIS — Z98.84 BARIATRIC SURGERY STATUS: ICD-10-CM

## 2024-04-10 DIAGNOSIS — Z98.84 S/P GASTRIC BYPASS: ICD-10-CM

## 2024-04-10 DIAGNOSIS — Z98.84 STATUS POST BARIATRIC SURGERY: ICD-10-CM

## 2024-04-10 DIAGNOSIS — R10.11 CHRONIC RIGHT UPPER QUADRANT PAIN: ICD-10-CM

## 2024-04-10 DIAGNOSIS — E87.6 HYPOKALEMIA: ICD-10-CM

## 2024-04-10 DIAGNOSIS — R10.13 EPIGASTRIC PAIN: ICD-10-CM

## 2024-04-10 DIAGNOSIS — K21.9 GASTROESOPHAGEAL REFLUX DISEASE, UNSPECIFIED WHETHER ESOPHAGITIS PRESENT: ICD-10-CM

## 2024-04-10 DIAGNOSIS — K28.9 ULCER JEJUNUM: ICD-10-CM

## 2024-04-10 DIAGNOSIS — R10.13 DYSPEPSIA: ICD-10-CM

## 2024-04-10 PROCEDURE — 3008F BODY MASS INDEX DOCD: CPT | Performed by: SURGERY

## 2024-04-10 PROCEDURE — 99215 OFFICE O/P EST HI 40 MIN: CPT | Performed by: SURGERY

## 2024-04-10 PROCEDURE — 3079F DIAST BP 80-89 MM HG: CPT | Performed by: SURGERY

## 2024-04-10 PROCEDURE — 3077F SYST BP >= 140 MM HG: CPT | Performed by: SURGERY

## 2024-04-10 RX ORDER — SUCRALFATE 1 G/10ML
1 SUSPENSION ORAL 4 TIMES DAILY
Qty: 1200 ML | Refills: 0 | Status: SHIPPED | OUTPATIENT
Start: 2024-04-10 | End: 2024-04-11

## 2024-04-10 NOTE — PATIENT INSTRUCTIONS
A/P:   Take carafate 4 times/day  Make the pills into a slurry.  I will scope you in a month, with a possible dilation.     Follow the pouch rules:    - - Eat 5 small meals per day (3 meals and 2 snacks)  - Take in at least 60 g protein daily (try to get through lean meats, dairy, legumes)  - Eat 5 vegetables per day  - No sweets, fruits are fine.  Berries and citrus are lower glycemic index fruits  -Limit rice, bread, pasta and potatoes.  These should only be consumed after having your protein and vegetables  - Drink at least 60 oz fluid daily, (non caffeinated, no carbonated beverages, sugar free)  - Get 60 minutes of exercise daily           Please have your yearly lab work done (if you have not already) - We will call you with any abnormal lab results.     Be sure to continue with exercise, goal should be 3-5 times a week 60 minutes at a time.     Increase variety and intensity of your work out routine.     Make sure you are taking your multivitamin, B12 and calcium.     See the Dietician as needed.     Dr. Rosy Handley M.D., MPH  Director of Bariatric & Minimally Invasive Surgery  [place] [address]  T:  [phone]  F:  [phone]      Shelby Freedman, RN Assistant Nurse Manager, Care Coordinator Patient Nursing Contact  T: [phone]  F: [phone]     Esther Campuzano, Patient Navigator- Bariatric Surgery [facility] Patient Clearance Questions & Tracking Contact  T: [phone] F: [phone]

## 2024-04-11 DIAGNOSIS — R10.13 EPIGASTRIC PAIN: ICD-10-CM

## 2024-04-11 DIAGNOSIS — K28.9 ULCER JEJUNUM: ICD-10-CM

## 2024-04-13 RX ORDER — SUCRALFATE 1 G/1
1 TABLET ORAL
Qty: 120 TABLET | Refills: 2 | Status: SHIPPED | OUTPATIENT
Start: 2024-04-13 | End: 2024-07-12

## 2024-04-24 ENCOUNTER — TELEPHONE (OUTPATIENT)
Dept: PRIMARY CARE | Facility: CLINIC | Age: 49
End: 2024-04-24
Payer: COMMERCIAL

## 2024-04-24 DIAGNOSIS — M47.816 FACET DEGENERATION OF LUMBAR REGION: ICD-10-CM

## 2024-04-24 DIAGNOSIS — G89.29 OTHER CHRONIC PAIN: ICD-10-CM

## 2024-04-24 DIAGNOSIS — K21.00 GASTROESOPHAGEAL REFLUX DISEASE WITH ESOPHAGITIS WITHOUT HEMORRHAGE: ICD-10-CM

## 2024-04-24 DIAGNOSIS — M51.36 DISCOGENIC LOW BACK PAIN: ICD-10-CM

## 2024-04-24 NOTE — TELEPHONE ENCOUNTER
Patient needs refill of :    oxyCODONE-acetaminophen (Percocet) 5-325 mg tablet  Take 1-2 tablets by mouth every 6 hours if needed for severe pain      famotidine (Pepcid) 40 mg tablet: Take 1 tablet (40 mg) by mouth 2 times a day           (1DAY left)    Pharmacy :     Next appointment :     Thanks...

## 2024-04-25 RX ORDER — OXYCODONE AND ACETAMINOPHEN 5; 325 MG/1; MG/1
1-2 TABLET ORAL EVERY 6 HOURS PRN
Qty: 60 TABLET | Refills: 0 | Status: SHIPPED | OUTPATIENT
Start: 2024-04-25 | End: 2024-05-25

## 2024-04-25 RX ORDER — FAMOTIDINE 40 MG/1
40 TABLET, FILM COATED ORAL 2 TIMES DAILY
Qty: 180 TABLET | Refills: 3 | Status: SHIPPED | OUTPATIENT
Start: 2024-04-25 | End: 2025-04-25

## 2024-04-25 NOTE — TELEPHONE ENCOUNTER
Patient was suppose to come in tomorrow for his appointment, looks like he is schedule for 1 month out, are you okay with refilling or I can try and move his appointment up?

## 2024-04-25 NOTE — TELEPHONE ENCOUNTER
Not sure if this refill was to go to Dr Sanders         Patient needs refill of :     oxyCODONE-acetaminophen (Percocet) 5-325 mg tablet  Take 1-2 tablets by mouth every 6 hours if needed for severe pain                  famotidine (Pepcid) 40 mg tablet: Take 1 tablet (40 mg) by mouth 2 times a day               (1DAY left)     Pharmacy :      Next appointment :      Thanks...

## 2024-04-26 ENCOUNTER — APPOINTMENT (OUTPATIENT)
Dept: PRIMARY CARE | Facility: CLINIC | Age: 49
End: 2024-04-26
Payer: COMMERCIAL

## 2024-05-07 ENCOUNTER — ANESTHESIA EVENT (OUTPATIENT)
Dept: GASTROENTEROLOGY | Facility: HOSPITAL | Age: 49
End: 2024-05-07
Payer: COMMERCIAL

## 2024-05-07 PROBLEM — J44.9 CHRONIC OBSTRUCTIVE PULMONARY DISEASE (MULTI): Status: ACTIVE | Noted: 2024-05-07

## 2024-05-08 ENCOUNTER — ANESTHESIA (OUTPATIENT)
Dept: GASTROENTEROLOGY | Facility: HOSPITAL | Age: 49
End: 2024-05-08
Payer: COMMERCIAL

## 2024-05-08 ENCOUNTER — HOSPITAL ENCOUNTER (OUTPATIENT)
Dept: GASTROENTEROLOGY | Facility: HOSPITAL | Age: 49
Setting detail: OUTPATIENT SURGERY
Discharge: HOME | End: 2024-05-08
Payer: COMMERCIAL

## 2024-05-08 ENCOUNTER — TELEPHONE (OUTPATIENT)
Dept: SURGERY | Facility: CLINIC | Age: 49
End: 2024-05-08

## 2024-05-08 VITALS
HEIGHT: 75 IN | OXYGEN SATURATION: 99 % | SYSTOLIC BLOOD PRESSURE: 130 MMHG | DIASTOLIC BLOOD PRESSURE: 81 MMHG | RESPIRATION RATE: 18 BRPM | HEART RATE: 62 BPM | BODY MASS INDEX: 27.35 KG/M2 | TEMPERATURE: 97.2 F | WEIGHT: 220 LBS

## 2024-05-08 DIAGNOSIS — R10.13 EPIGASTRIC PAIN: ICD-10-CM

## 2024-05-08 DIAGNOSIS — R10.11 CHRONIC RIGHT UPPER QUADRANT PAIN: ICD-10-CM

## 2024-05-08 DIAGNOSIS — K28.9 ULCER JEJUNUM: ICD-10-CM

## 2024-05-08 DIAGNOSIS — K21.9 GASTROESOPHAGEAL REFLUX DISEASE, UNSPECIFIED WHETHER ESOPHAGITIS PRESENT: ICD-10-CM

## 2024-05-08 DIAGNOSIS — R10.13 DYSPEPSIA: ICD-10-CM

## 2024-05-08 DIAGNOSIS — G89.29 CHRONIC RIGHT UPPER QUADRANT PAIN: ICD-10-CM

## 2024-05-08 PROCEDURE — 3700000001 HC GENERAL ANESTHESIA TIME - INITIAL BASE CHARGE: Performed by: SURGERY

## 2024-05-08 PROCEDURE — A43239 PR EDG TRANSORAL BIOPSY SINGLE/MULTIPLE

## 2024-05-08 PROCEDURE — 3700000002 HC GENERAL ANESTHESIA TIME - EACH INCREMENTAL 1 MINUTE: Performed by: SURGERY

## 2024-05-08 PROCEDURE — 43245 EGD DILATE STRICTURE: CPT | Performed by: SURGERY

## 2024-05-08 PROCEDURE — 2500000004 HC RX 250 GENERAL PHARMACY W/ HCPCS (ALT 636 FOR OP/ED)

## 2024-05-08 PROCEDURE — 7100000009 HC PHASE TWO TIME - INITIAL BASE CHARGE: Performed by: SURGERY

## 2024-05-08 PROCEDURE — 2500000005 HC RX 250 GENERAL PHARMACY W/O HCPCS

## 2024-05-08 PROCEDURE — 2720000007 HC OR 272 NO HCPCS: Performed by: SURGERY

## 2024-05-08 PROCEDURE — A43239 PR EDG TRANSORAL BIOPSY SINGLE/MULTIPLE: Performed by: ANESTHESIOLOGY

## 2024-05-08 PROCEDURE — 43249 ESOPH EGD DILATION <30 MM: CPT | Performed by: STUDENT IN AN ORGANIZED HEALTH CARE EDUCATION/TRAINING PROGRAM

## 2024-05-08 PROCEDURE — 43235 EGD DIAGNOSTIC BRUSH WASH: CPT | Performed by: SURGERY

## 2024-05-08 PROCEDURE — 7100000010 HC PHASE TWO TIME - EACH INCREMENTAL 1 MINUTE: Performed by: SURGERY

## 2024-05-08 PROCEDURE — C1726 CATH, BAL DIL, NON-VASCULAR: HCPCS | Performed by: SURGERY

## 2024-05-08 RX ORDER — LIDOCAINE HCL/PF 100 MG/5ML
SYRINGE (ML) INTRAVENOUS AS NEEDED
Status: DISCONTINUED | OUTPATIENT
Start: 2024-05-08 | End: 2024-05-08

## 2024-05-08 RX ORDER — ACETAMINOPHEN 325 MG/1
650 TABLET ORAL EVERY 4 HOURS PRN
OUTPATIENT
Start: 2024-05-08

## 2024-05-08 RX ORDER — ONDANSETRON 4 MG/1
4 TABLET, ORALLY DISINTEGRATING ORAL EVERY 8 HOURS PRN
Qty: 20 TABLET | Refills: 0 | Status: SHIPPED | OUTPATIENT
Start: 2024-05-08 | End: 2024-05-31 | Stop reason: SINTOL

## 2024-05-08 RX ORDER — ONDANSETRON HYDROCHLORIDE 2 MG/ML
4 INJECTION, SOLUTION INTRAVENOUS ONCE AS NEEDED
OUTPATIENT
Start: 2024-05-08

## 2024-05-08 RX ORDER — PROPOFOL 10 MG/ML
INJECTION, EMULSION INTRAVENOUS AS NEEDED
Status: DISCONTINUED | OUTPATIENT
Start: 2024-05-08 | End: 2024-05-08

## 2024-05-08 RX ORDER — SODIUM CHLORIDE, SODIUM LACTATE, POTASSIUM CHLORIDE, CALCIUM CHLORIDE 600; 310; 30; 20 MG/100ML; MG/100ML; MG/100ML; MG/100ML
100 INJECTION, SOLUTION INTRAVENOUS CONTINUOUS
OUTPATIENT
Start: 2024-05-08

## 2024-05-08 RX ORDER — LIDOCAINE HYDROCHLORIDE 10 MG/ML
0.1 INJECTION INFILTRATION; PERINEURAL ONCE
OUTPATIENT
Start: 2024-05-08 | End: 2024-05-08

## 2024-05-08 RX ADMIN — PROPOFOL 50 MG: 10 INJECTION, EMULSION INTRAVENOUS at 08:50

## 2024-05-08 RX ADMIN — PROPOFOL 70 MG: 10 INJECTION, EMULSION INTRAVENOUS at 08:44

## 2024-05-08 RX ADMIN — PROPOFOL 40 MG: 10 INJECTION, EMULSION INTRAVENOUS at 08:47

## 2024-05-08 RX ADMIN — SODIUM CHLORIDE, SODIUM LACTATE, POTASSIUM CHLORIDE, AND CALCIUM CHLORIDE: 600; 310; 30; 20 INJECTION, SOLUTION INTRAVENOUS at 08:40

## 2024-05-08 RX ADMIN — PROPOFOL 40 MG: 10 INJECTION, EMULSION INTRAVENOUS at 08:56

## 2024-05-08 RX ADMIN — PROPOFOL 50 MG: 10 INJECTION, EMULSION INTRAVENOUS at 08:54

## 2024-05-08 RX ADMIN — PROPOFOL 30 MG: 10 INJECTION, EMULSION INTRAVENOUS at 09:00

## 2024-05-08 RX ADMIN — PROPOFOL 40 MG: 10 INJECTION, EMULSION INTRAVENOUS at 08:58

## 2024-05-08 RX ADMIN — PROPOFOL 40 MG: 10 INJECTION, EMULSION INTRAVENOUS at 08:52

## 2024-05-08 RX ADMIN — PROPOFOL 40 MG: 10 INJECTION, EMULSION INTRAVENOUS at 08:46

## 2024-05-08 RX ADMIN — LIDOCAINE HYDROCHLORIDE 100 MG: 20 INJECTION INTRAVENOUS at 08:44

## 2024-05-08 ASSESSMENT — PAIN - FUNCTIONAL ASSESSMENT
PAIN_FUNCTIONAL_ASSESSMENT: 0-10

## 2024-05-08 ASSESSMENT — PAIN SCALES - GENERAL
PAINLEVEL_OUTOF10: 0 - NO PAIN

## 2024-05-08 ASSESSMENT — COLUMBIA-SUICIDE SEVERITY RATING SCALE - C-SSRS
2. HAVE YOU ACTUALLY HAD ANY THOUGHTS OF KILLING YOURSELF?: NO
1. IN THE PAST MONTH, HAVE YOU WISHED YOU WERE DEAD OR WISHED YOU COULD GO TO SLEEP AND NOT WAKE UP?: NO
6. HAVE YOU EVER DONE ANYTHING, STARTED TO DO ANYTHING, OR PREPARED TO DO ANYTHING TO END YOUR LIFE?: NO

## 2024-05-08 NOTE — ANESTHESIA POSTPROCEDURE EVALUATION
October 20, 2022     Patient: Amado Duran   YOB: 2015       To Whom it May Concern:    Amado Duran has received treatment at this office on the following dates: 10/20/2022. They may resume school on 10/24/2022. Per mother patient was unable to attend school from 10/17/2022 due to his symptoms.    If you have any questions or concerns, please don't hesitate to call.      Sincerely,         [facility] [address] [address]      Medical information is confidential and cannot be disclosed without the written consent of the patient or his representative.       Patient: Lito Unger    Procedure Summary       Date: 05/08/24 Room / Location: [facility]    Anesthesia Start: 0840 Anesthesia Stop: 0910    Procedure: EGD Diagnosis:       Epigastric pain      Chronic right upper quadrant pain      Gastroesophageal reflux disease, unspecified whether esophagitis present      Dyspepsia      Ulcer jejunum    Scheduled Providers: Rosy Handley MD MPH; Carey Cano MD Responsible Provider: Carey Cano MD    Anesthesia Type: MAC ASA Status: 3            Anesthesia Type: MAC    Vitals Value Taken Time   /86 05/08/24 0910   Temp 36.2 05/08/24 0910   Pulse 86 05/08/24 0910   Resp 16 05/08/24 0910   SpO2 98 05/08/24 0910       Anesthesia Post Evaluation    Patient location during evaluation: PACU  Patient participation: complete - patient participated  Level of consciousness: awake and responsive to verbal stimuli  Pain management: adequate  Airway patency: patent  Cardiovascular status: acceptable, hemodynamically stable and blood pressure returned to baseline  Respiratory status: spontaneous ventilation, room air and acceptable  Hydration status: acceptable  Postoperative Nausea and Vomiting: none        There were no known notable events for this encounter.

## 2024-05-08 NOTE — ANESTHESIA PREPROCEDURE EVALUATION
Patient: Lito Unger    Procedure Information       Date/Time: 05/08/24 0800    Scheduled providers: Rosy Handley MD MPH; Carey Cano MD    Procedure: EGD    Location: [facility]            Relevant Problems   Anesthesia (within normal limits)  No family hx MH      Cardiac   (+) Primary hypertension      Pulmonary (within normal limits)      Neuro (within normal limits)      GI  Gastric bypass  Abdominal pain  Dysphagia can swallow with difficulty rare vomiting   (+) Esophageal reflux      /Renal (within normal limits)      Liver (within normal limits)      Endocrine   (+) Morbid obesity (Multi)      Hematology (within normal limits)      Musculoskeletal   (+) Facet degeneration of lumbar region   (+) Gout, arthropathy   (+) Lumbar degenerative disc disease   (+) Lumbar spondylosis   (+) Other intervertebral disc degeneration, lumbosacral region      HEENT (within normal limits)      ID (within normal limits)      Skin (within normal limits)      GYN (within normal limits)       Clinical information reviewed:                   NPO Detail:  No data recorded     Physical Exam    Airway  Mallampati: II  TM distance: >3 FB     Cardiovascular - normal exam     Dental   Comments: intact   Pulmonary - normal exam     Abdominal          Vitals:    05/08/24 0725   BP: 142/90   Pulse: 54   Resp: 17   Temp: 36.6 °C (97.8 °F)   SpO2: 99%       Past Surgical History:   Procedure Laterality Date    APPENDECTOMY  09/17/2013    Laparoscopic Appendectomy    GASTRIC BYPASS  08/21/2018    Gastric Surgery For Morbid Obesity Bypass With Neymar-en-Y    OTHER SURGICAL HISTORY  10/27/2014    Incisional Hernia Repair    SHOULDER SURGERY  10/27/2014    Shoulder Surgery    TONSILLECTOMY  10/27/2014    Tonsillectomy     Past Medical History:   Diagnosis Date    Abnormal weight loss 10/16/2018    Rapid weight loss    Acute bronchospasm 02/23/2018    Post-infection bronchospasm    Anesthesia of skin     Numbness, limb     Anxiety disorder, unspecified 08/30/2015    Anxiety and depression    Biomechanical lesion, unspecified 04/16/2015    Nonallopathic lesion of rib cage    Body mass index (BMI) 38.0-38.9, adult 07/05/2018    BMI 38.0-38.9,adult    Carpal tunnel syndrome, bilateral upper limbs 05/23/2017    Bilateral carpal tunnel syndrome    Chronic gout, unspecified, without tophus (tophi) 09/03/2019    Chronic gout    Cramp and spasm 09/08/2017    Muscle cramping    Dietary counseling and surveillance     Pre-bariatric surgery nutrition evaluation    Idiopathic gout, right hand 10/14/2019    Idiopathic gout of right hand, unspecified chronicity    Morbid (severe) obesity due to excess calories (Multi) 05/31/2017    Obesity, morbid, BMI 40.0-49.9    Other acute postprocedural pain     Post-op pain    Other intervertebral disc degeneration, lumbar region 04/19/2016    Other intervertebral disc degeneration, lumbar region    Other specified anxiety disorders 05/31/2017    Other specified anxiety disorders    Other symptoms and signs involving the genitourinary system 11/04/2016    Bladder pain    Pain in leg, unspecified 09/15/2013    Lower extremity pain    Personal history of other benign neoplasm 06/27/2019    History of other benign neoplasm    Personal history of other diseases of the circulatory system 09/26/2013    History of hypertension    Personal history of other diseases of the digestive system 10/08/2015    History of esophageal reflux    Personal history of other diseases of the digestive system 08/01/2018    History of umbilical hernia    Personal history of other diseases of the musculoskeletal system and connective tissue 12/19/2016    History of joint pain    Personal history of other diseases of the musculoskeletal system and connective tissue 09/01/2017    History of back pain    Personal history of other diseases of the musculoskeletal system and connective tissue 09/08/2017    History of muscle spasm     Personal history of other diseases of the nervous system and sense organs     History of sleep apnea    Personal history of other diseases of the respiratory system 01/07/2020    History of sinusitis    Personal history of other diseases of urinary system 11/04/2016    History of hematuria    Personal history of other diseases of urinary system 08/02/2021    History of hematuria    Personal history of other endocrine, nutritional and metabolic disease 05/16/2017    History of hypokalemia    Personal history of other endocrine, nutritional and metabolic disease 04/16/2015    History of hypokalemia    Personal history of other endocrine, nutritional and metabolic disease     History of dehydration    Personal history of other mental and behavioral disorders     History of depression    Personal history of other specified conditions 12/07/2015    History of paresthesia    Personal history of other specified conditions 10/29/2014    History of weight gain    Personal history of other specified conditions 10/29/2015    History of headache    Personal history of other specified conditions     History of heartburn    Personal history of other specified conditions 02/23/2018    History of abdominal pain    Personal history of other specified conditions 03/11/2017    History of polyuria    Personal history of other specified conditions 11/04/2016    History of gross hematuria    Personal history of other specified conditions 07/21/2021    History of dysuria    Personal history of other specified conditions 07/21/2021    History of left flank pain    Personal history of other specified conditions 10/24/2016    History of dysuria    Personal history of other specified conditions 11/12/2013    History of breast lump    Personal history of other specified conditions 02/23/2018    History of abdominal pain    Personal history of urinary (tract) infections 09/26/2013    History of cystitis    Snoring     Snoring    Sprain of  ligaments of lumbar spine, initial encounter 12/19/2016    Low back sprain    Strain of muscle, fascia and tendon at neck level, initial encounter 12/07/2018    Cervical strain    Unspecified disturbances of skin sensation 10/14/2019    Paresthesias/numbness       Current Outpatient Medications:     allopurinol (Zyloprim) 100 mg tablet, Take 4 tablets (400 mg) by mouth once daily., Disp: 360 tablet, Rfl: 3    cyclobenzaprine (Fexmid) 7.5 mg tablet, Take 1 tablet (7.5 mg) by mouth 3 times a day as needed for muscle spasms., Disp: 60 tablet, Rfl: 2    famotidine (Pepcid) 40 mg tablet, Take 1 tablet (40 mg) by mouth 2 times a day., Disp: 180 tablet, Rfl: 3    metaxalone (Skelaxin) 800 mg tablet, Take 1 tablet (800 mg) by mouth 2 times a day., Disp: 60 tablet, Rfl: 3    omeprazole (PriLOSEC) 40 mg DR capsule, Take 1 capsule (40 mg) by mouth once daily in the morning. Take before meals., Disp: , Rfl:     oxyCODONE-acetaminophen (Percocet) 5-325 mg tablet, Take 1-2 tablets by mouth every 6 hours if needed for severe pain (7 - 10)., Disp: 60 tablet, Rfl: 0    sucralfate (Carafate) 1 gram tablet, Take 1 tablet (1 g) by mouth 4 times a day before meals. Take 1 Tab by mouth on an empty stomach four times daily:  before meals and at bed time x 30 days.  May make into a slurry with small amount of water., Disp: 120 tablet, Rfl: 2    diclofenac sodium 1 % kit, Apply 4 g topically every 12 hours if needed (Pain)., Disp: , Rfl:     naloxone (Narcan) 4 mg/0.1 mL nasal spray, Administer 1 spray (4 mg) into affected nostril(s) if needed for opioid reversal., Disp: 2 each, Rfl: 0  Prior to Admission medications    Medication Sig Start Date End Date Taking? Authorizing Provider   allopurinol (Zyloprim) 100 mg tablet Take 4 tablets (400 mg) by mouth once daily. 10/26/23  Yes Jeannie Sanders, DO   cyclobenzaprine (Fexmid) 7.5 mg tablet Take 1 tablet (7.5 mg) by mouth 3 times a day as needed for muscle spasms. 1/26/24  Yes Jeannie Sanders, DO    famotidine (Pepcid) 40 mg tablet Take 1 tablet (40 mg) by mouth 2 times a day. 4/25/24 4/25/25 Yes Jeannie Sanders DO   metaxalone (Skelaxin) 800 mg tablet Take 1 tablet (800 mg) by mouth 2 times a day. 10/26/23  Yes Jeannie Sanders DO   omeprazole (PriLOSEC) 40 mg DR capsule Take 1 capsule (40 mg) by mouth once daily in the morning. Take before meals. 3/31/22  Yes Historical Provider, MD   oxyCODONE-acetaminophen (Percocet) 5-325 mg tablet Take 1-2 tablets by mouth every 6 hours if needed for severe pain (7 - 10). 4/25/24 5/25/24 Yes Jeannie Sanders DO   sucralfate (Carafate) 1 gram tablet Take 1 tablet (1 g) by mouth 4 times a day before meals. Take 1 Tab by mouth on an empty stomach four times daily:  before meals and at bed time x 30 days.  May make into a slurry with small amount of water. 4/13/24 7/12/24 Yes Kendall Colby MD   diclofenac sodium 1 % kit Apply 4 g topically every 12 hours if needed (Pain). 9/30/15   Historical Provider, MD   naloxone (Narcan) 4 mg/0.1 mL nasal spray Administer 1 spray (4 mg) into affected nostril(s) if needed for opioid reversal. 1/26/24   Jeannie Sanders DO     Allergies   Allergen Reactions    Ciprofloxacin Unknown    Gloves, Latex With Aloe Vera Unknown    Latex Unknown    Levofloxacin Unknown    Nsaids (Non-Steroidal Anti-Inflammatory Drug) Other    Adhesive Tape-Silicones Rash     Social History     Tobacco Use    Smoking status: Never    Smokeless tobacco: Never   Substance Use Topics    Alcohol use: Not Currently         Chemistry    Lab Results   Component Value Date/Time     03/04/2024 1242    K 4.7 03/04/2024 1242     03/04/2024 1242    CO2 34 (H) 03/04/2024 1242    BUN 5 (L) 03/04/2024 1242    CREATININE 0.83 03/04/2024 1242    Lab Results   Component Value Date/Time    CALCIUM 10.1 03/04/2024 1242    ALKPHOS 78 03/04/2024 1242    AST 23 03/04/2024 1242    ALT 19 03/04/2024 1242    BILITOT 0.6 03/04/2024 1242          Lab Results   Component Value  Date/Time    WBC 7.1 03/04/2024 1242    HGB 13.8 03/04/2024 1242    HCT 43.6 03/04/2024 1242     03/04/2024 1242     Lab Results   Component Value Date/Time    PROTIME 11.1 03/30/2021 1006    INR 1.0 03/30/2021 1006     No results found for this or any previous visit (from the past 4464 hour(s)).  No results found for this or any previous visit from the past 1095 days.       Anesthesia Plan    History of general anesthesia?: yes  History of complications of general anesthesia?: no    ASA 3     MAC     intravenous induction   Anesthetic plan and risks discussed with patient.  Use of blood products discussed with patient who consented to blood products.    Plan discussed with CAA and CRNA.

## 2024-05-10 DIAGNOSIS — R10.11 CHRONIC RIGHT UPPER QUADRANT PAIN: ICD-10-CM

## 2024-05-10 DIAGNOSIS — Z98.84 STATUS POST BARIATRIC SURGERY: ICD-10-CM

## 2024-05-10 DIAGNOSIS — R10.13 DYSPEPSIA: ICD-10-CM

## 2024-05-10 DIAGNOSIS — K21.9 GASTROESOPHAGEAL REFLUX DISEASE, UNSPECIFIED WHETHER ESOPHAGITIS PRESENT: ICD-10-CM

## 2024-05-10 DIAGNOSIS — G89.29 CHRONIC RIGHT UPPER QUADRANT PAIN: ICD-10-CM

## 2024-05-10 DIAGNOSIS — R10.13 EPIGASTRIC PAIN: ICD-10-CM

## 2024-05-10 DIAGNOSIS — K28.9 ULCER JEJUNUM: ICD-10-CM

## 2024-05-31 ENCOUNTER — OFFICE VISIT (OUTPATIENT)
Dept: PRIMARY CARE | Facility: CLINIC | Age: 49
End: 2024-05-31
Payer: COMMERCIAL

## 2024-05-31 VITALS
BODY MASS INDEX: 28.5 KG/M2 | HEART RATE: 65 BPM | TEMPERATURE: 97 F | SYSTOLIC BLOOD PRESSURE: 140 MMHG | DIASTOLIC BLOOD PRESSURE: 90 MMHG | WEIGHT: 228 LBS | OXYGEN SATURATION: 98 %

## 2024-05-31 DIAGNOSIS — M51.36 DISCOGENIC LOW BACK PAIN: ICD-10-CM

## 2024-05-31 DIAGNOSIS — I10 PRIMARY HYPERTENSION: ICD-10-CM

## 2024-05-31 DIAGNOSIS — M51.36 LUMBAR DEGENERATIVE DISC DISEASE: Primary | ICD-10-CM

## 2024-05-31 DIAGNOSIS — G89.29 OTHER CHRONIC PAIN: ICD-10-CM

## 2024-05-31 DIAGNOSIS — M47.816 FACET DEGENERATION OF LUMBAR REGION: ICD-10-CM

## 2024-05-31 DIAGNOSIS — K21.00 GASTROESOPHAGEAL REFLUX DISEASE WITH ESOPHAGITIS WITHOUT HEMORRHAGE: ICD-10-CM

## 2024-05-31 PROCEDURE — 99214 OFFICE O/P EST MOD 30 MIN: CPT | Performed by: FAMILY MEDICINE

## 2024-05-31 PROCEDURE — 3008F BODY MASS INDEX DOCD: CPT | Performed by: FAMILY MEDICINE

## 2024-05-31 PROCEDURE — 3078F DIAST BP <80 MM HG: CPT | Performed by: FAMILY MEDICINE

## 2024-05-31 PROCEDURE — 3077F SYST BP >= 140 MM HG: CPT | Performed by: FAMILY MEDICINE

## 2024-05-31 RX ORDER — METAXALONE 800 MG/1
800 TABLET ORAL 2 TIMES DAILY
Qty: 30 TABLET | Refills: 3 | Status: SHIPPED | OUTPATIENT
Start: 2024-05-31

## 2024-05-31 RX ORDER — OMEPRAZOLE 40 MG/1
40 CAPSULE, DELAYED RELEASE ORAL
Qty: 180 CAPSULE | Refills: 3 | Status: SHIPPED | OUTPATIENT
Start: 2024-05-31 | End: 2024-06-05

## 2024-05-31 RX ORDER — SUCRALFATE 1 G/10ML
1 SUSPENSION ORAL 4 TIMES DAILY
Qty: 1200 ML | Refills: 0 | Status: CANCELLED | OUTPATIENT
Start: 2024-05-31 | End: 2024-06-30

## 2024-05-31 ASSESSMENT — PAIN SCALES - GENERAL: PAINLEVEL: 4

## 2024-05-31 NOTE — PROGRESS NOTES
Subjective   Patient ID: Lito Unger is a 48 y.o. adult who presents for Follow-up (3 MONTH ).    HPI    HTN: BP not at goal today  Denies CP, SOB, Edema, palpitations or headache.     GERD: had EGD with Dr. Handley,  Found ulceration and stenosed at gastrojejunal   Dilation performed     CHRONIC LOW BACK PAIN:  Pain much worse this week - tried going to chiropractor which helped for a few minutes  Failed NSAIDs due to stomach issues and s/p gastric bipass  Topamax gave him psychiatric symptoms    OARRS:  Jeannie Sanders, DO on 6/26/2024  7:52 AM  I have personally reviewed the OARRS report for Lito Unger. I have considered the risks of abuse, dependence, addiction and diversion    Is the patient prescribed a combination of a benzodiazepine and opioid?  No    Last Urine Drug Screen / ordered today: No  Recent Results (from the past 8760 hour(s))   Opiate Confirmation, Urine    Collection Time: 01/26/24 10:48 AM   Result Value Ref Range    6-Acetylmorphine <25 <25 ng/mL    Codeine <50 <50 ng/mL    Hydrocodone <25 <25 ng/mL    Hydromorphone <25 <25 ng/mL    Morphine  <50 <50 ng/mL    Norhydrocodone <25 <25 ng/mL    Noroxycodone <25 <25 ng/mL    Oxycodone <25 <25 ng/mL    Oxymorphone <25 <25 ng/mL   Drug Screen, Urine With Reflex to Confirmation    Collection Time: 01/26/24 10:48 AM   Result Value Ref Range    Amphetamine Screen, Urine Presumptive Negative Presumptive Negative    Barbiturate Screen, Urine Presumptive Negative Presumptive Negative    Benzodiazepines Screen, Urine Presumptive Negative Presumptive Negative    Cannabinoid Screen, Urine Presumptive Negative Presumptive Negative    Cocaine Metabolite Screen, Urine Presumptive Negative Presumptive Negative    Fentanyl Screen, Urine Presumptive Negative Presumptive Negative    Opiate Screen, Urine Presumptive Negative Presumptive Negative    Oxycodone Screen, Urine Presumptive Negative Presumptive Negative    PCP Screen, Urine Presumptive Negative  Presumptive Negative     Results are as expected.   Clinical rationale for not completing a Urine Drug Screen:     Controlled Substance Agreement:  Date of the Last Agreement: 1/26/2024  Reviewed Controlled Substance Agreement including but not limited to the benefits, risks, and alternatives to treatment with a Controlled Substance medication(s).  Reviewed Controlled Substance Agreement including but not limited to the benefits, risk, and alternatives to treatment with a Controlled Substance medication(s)     Opioids:  What is the patient's goal of therapy? Maintain adequate pain levels to carry out daily activities.  Is this being achieved with current treatment? yes    I have calculated the patient's Morphine Dose Equivalent (MED):   I have considered referral to Pain Management and/or a specialist, and do not feel it is necessary at this time.  Patient has been evaluated with pain management specialist in the past    I feel that it is clinically indicated to continue this current medication regimen after consideration of alternative therapies, and other non-opioid treatment.    Opioid Risk Screening:  Family History of Substance Abuse  Alcohol: 0 (10/26/2023 11:00 AM)  Illegal Drugs: 0 (10/26/2023 11:00 AM)  Prescription Drugs: 0 (10/26/2023 11:00 AM)    Personal History of Substance Abuse  Alcohol: 0 (10/26/2023 11:00 AM)  Illegal Drugs: 0 (10/26/2023 11:00 AM)  Prescription Drugs: 0 (10/26/2023 11:00 AM)    Patient Age (16-45)  Age (16-45): 0 (10/26/2023 11:00 AM)    History of Preadolescent Sexual Abuse  History of Preadolescent Sexual Abuse: .0 (10/26/2023 11:00 AM)    Psychological Disease  Attention Deficit Disorder, Obsessive Compulsive Disorder, Bipolar, Schizophrenia: 0 (10/26/2023 11:00 AM)  Depression: 1 (10/26/2023 11:00 AM)    Total Score  Total Score: 1 (10/26/2023 11:00 AM)    Total Score Risk Category  TOTAL SCORE CATEGORY: Low Risk (0-3) (10/26/2023 11:00 AM)        Pain Assessment:  No data  recorded      Review of Systems    Review of Systems negative except as noted in HPI and Chief complaint.     Objective     VITALS:  /90 (BP Location: Right arm, Patient Position: Sitting, BP Cuff Size: Large adult)   Pulse 65   Temp 36.1 °C (97 °F) (Temporal)   Wt 103 kg (228 lb)   SpO2 98%   BMI 28.50 kg/m²      Physical Exam  Constitutional:       General: Lito is not in acute distress.     Appearance: Normal appearance. Lito is not ill-appearing.   HENT:      Head: Normocephalic and atraumatic.   Neck:      Vascular: No carotid bruit.   Cardiovascular:      Rate and Rhythm: Normal rate and regular rhythm.      Pulses: Normal pulses.      Heart sounds: Normal heart sounds. No murmur heard.     No gallop.   Pulmonary:      Effort: Pulmonary effort is normal.      Breath sounds: Normal breath sounds. No wheezing, rhonchi or rales.   Musculoskeletal:      Cervical back: Normal range of motion and neck supple. No rigidity or tenderness.      Comments: Lumbar paraspinal tightness, PSIS tenderness noted with palpation.   Lymphadenopathy:      Cervical: No cervical adenopathy.   Skin:     General: Skin is warm and dry.   Neurological:      Mental Status: Lito is alert.   Psychiatric:         Mood and Affect: Mood normal.         Behavior: Behavior normal.         Assessment/Plan   Problem List Items Addressed This Visit       Chronic pain     Unchanged but stable on current regimen.  Encouraging tentative follow up with pain management for possible injections with lumbar radiculopathy persisting.  The OARRS website was checked and validated.  I have personally reviewed the OARRS report for this patient.  This report has been scanned into the electronic medical record.  I have considered the risks of abuse,, dependence, addiction and diversion.  I believe that it is clinically appropriate for this patient to be prescribed this medication.  The patient has been told not to increase dose or refill  sooner than indicated.  Any variation from this practice will results in my denying further prescriptions and possible discharge from care.  Follow up 3 months.         Relevant Medications    oxyCODONE-acetaminophen (Percocet) 5-325 mg tablet    Esophageal reflux     Not significantly improved - will try to order Carafate liquid.  Continue PPI at current dosage as per recommendation of Dr. Handley.  Follow up at least annually - soner with any problems or change in symptoms.         Relevant Medications    omeprazole (PriLOSEC) 40 mg DR capsule    Facet degeneration of lumbar region     As above - no changes in medication dosages,  Follow up 3 months.         Relevant Medications    oxyCODONE-acetaminophen (Percocet) 5-325 mg tablet    Discogenic low back pain     Stable on current regimen - encouraging to follow up with pain management to discuss nerve ablation or other treatment options for chronic pain.    The OARRS website was checked and validated.  I have personally reviewed the OARRS report for this patient.  This report has been scanned into the electronic medical record.  I have considered the risks of abuse,, dependence, addiction and diversion.  I believe that it is clinically appropriate for this patient to be prescribed this medication.  The patient has been told not to increase dose or refill sooner than indicated.  Any variation from this practice will results in my denying further prescriptions and possible discharge from care.   Follow up 3 months.         Relevant Medications    metaxalone (Skelaxin) 800 mg tablet    cyclobenzaprine (Fexmid) 7.5 mg tablet    oxyCODONE-acetaminophen (Percocet) 5-325 mg tablet    Lumbar degenerative disc disease - Primary     Regimen unchanged.  The OARRS website was checked and validated.  I have personally reviewed the OARRS report for this patient.  This report has been scanned into the electronic medical record.  I have considered the risks of abuse,,  dependence, addiction and diversion.  I believe that it is clinically appropriate for this patient to be prescribed this medication.  The patient has been told not to increase dose or refill sooner than indicated.  Any variation from this practice will results in my denying further prescriptions and possible discharge from care.           Relevant Medications    metaxalone (Skelaxin) 800 mg tablet    cyclobenzaprine (Fexmid) 7.5 mg tablet       FOLLOW UP 3 MONTHS, SOONER WITH ANY PROBLEMS OR CONCERNS.

## 2024-06-03 DIAGNOSIS — M51.36 LUMBAR DEGENERATIVE DISC DISEASE: ICD-10-CM

## 2024-06-03 NOTE — TELEPHONE ENCOUNTER
Patient calling in stating that the liquid Carafate and his pain medication were not called in, pended pain medication, please advise the Carafate.

## 2024-06-04 DIAGNOSIS — K21.00 GASTROESOPHAGEAL REFLUX DISEASE WITH ESOPHAGITIS WITHOUT HEMORRHAGE: Primary | ICD-10-CM

## 2024-06-04 DIAGNOSIS — R10.13 EPIGASTRIC PAIN: Primary | ICD-10-CM

## 2024-06-04 RX ORDER — OXYCODONE AND ACETAMINOPHEN 5; 325 MG/1; MG/1
1 TABLET ORAL EVERY 6 HOURS PRN
Qty: 60 TABLET | Refills: 0 | Status: SHIPPED | OUTPATIENT
Start: 2024-06-04 | End: 2024-07-04

## 2024-06-04 RX ORDER — SUCRALFATE 1 G/10ML
1 SUSPENSION ORAL 4 TIMES DAILY
Qty: 1200 ML | Refills: 0 | Status: SHIPPED | OUTPATIENT
Start: 2024-06-04 | End: 2024-07-04

## 2024-06-05 RX ORDER — OMEPRAZOLE 40 MG/1
CAPSULE, DELAYED RELEASE ORAL
Qty: 180 CAPSULE | Refills: 0 | Status: SHIPPED | OUTPATIENT
Start: 2024-06-05

## 2024-06-14 ENCOUNTER — APPOINTMENT (OUTPATIENT)
Dept: OPERATING ROOM | Facility: HOSPITAL | Age: 49
End: 2024-06-14
Payer: COMMERCIAL

## 2024-06-19 ENCOUNTER — APPOINTMENT (OUTPATIENT)
Dept: SURGERY | Facility: CLINIC | Age: 49
End: 2024-06-19
Payer: COMMERCIAL

## 2024-06-20 ENCOUNTER — ANESTHESIA EVENT (OUTPATIENT)
Dept: OPERATING ROOM | Facility: HOSPITAL | Age: 49
End: 2024-06-20
Payer: COMMERCIAL

## 2024-06-21 ENCOUNTER — HOSPITAL ENCOUNTER (OUTPATIENT)
Dept: OPERATING ROOM | Facility: HOSPITAL | Age: 49
Setting detail: OUTPATIENT SURGERY
Discharge: HOME | End: 2024-06-21
Payer: COMMERCIAL

## 2024-06-21 ENCOUNTER — ANESTHESIA (OUTPATIENT)
Dept: OPERATING ROOM | Facility: HOSPITAL | Age: 49
End: 2024-06-21
Payer: COMMERCIAL

## 2024-06-21 VITALS
HEART RATE: 54 BPM | OXYGEN SATURATION: 97 % | DIASTOLIC BLOOD PRESSURE: 86 MMHG | RESPIRATION RATE: 16 BRPM | WEIGHT: 224.43 LBS | TEMPERATURE: 97.2 F | SYSTOLIC BLOOD PRESSURE: 131 MMHG | BODY MASS INDEX: 27.9 KG/M2 | HEIGHT: 75 IN

## 2024-06-21 DIAGNOSIS — R10.11 CHRONIC RIGHT UPPER QUADRANT PAIN: ICD-10-CM

## 2024-06-21 DIAGNOSIS — G89.29 CHRONIC RIGHT UPPER QUADRANT PAIN: ICD-10-CM

## 2024-06-21 DIAGNOSIS — K21.9 GASTROESOPHAGEAL REFLUX DISEASE, UNSPECIFIED WHETHER ESOPHAGITIS PRESENT: ICD-10-CM

## 2024-06-21 DIAGNOSIS — R10.13 DYSPEPSIA: ICD-10-CM

## 2024-06-21 DIAGNOSIS — K28.9 ULCER JEJUNUM: ICD-10-CM

## 2024-06-21 DIAGNOSIS — Z98.84 STATUS POST BARIATRIC SURGERY: ICD-10-CM

## 2024-06-21 DIAGNOSIS — R10.13 EPIGASTRIC PAIN: ICD-10-CM

## 2024-06-21 PROCEDURE — 3700000002 HC GENERAL ANESTHESIA TIME - EACH INCREMENTAL 1 MINUTE: Performed by: ANESTHESIOLOGY

## 2024-06-21 PROCEDURE — 74360 X-RAY GUIDE GI DILATION: CPT | Performed by: SURGERY

## 2024-06-21 PROCEDURE — 7100000009 HC PHASE TWO TIME - INITIAL BASE CHARGE: Performed by: ANESTHESIOLOGY

## 2024-06-21 PROCEDURE — 7100000010 HC PHASE TWO TIME - EACH INCREMENTAL 1 MINUTE: Performed by: ANESTHESIOLOGY

## 2024-06-21 PROCEDURE — 3700000001 HC GENERAL ANESTHESIA TIME - INITIAL BASE CHARGE: Performed by: ANESTHESIOLOGY

## 2024-06-21 PROCEDURE — 2500000005 HC RX 250 GENERAL PHARMACY W/O HCPCS: Performed by: NURSE ANESTHETIST, CERTIFIED REGISTERED

## 2024-06-21 PROCEDURE — 3600000007 HC OR TIME - EACH INCREMENTAL 1 MINUTE - PROCEDURE LEVEL TWO: Performed by: ANESTHESIOLOGY

## 2024-06-21 PROCEDURE — C1726 CATH, BAL DIL, NON-VASCULAR: HCPCS | Performed by: ANESTHESIOLOGY

## 2024-06-21 PROCEDURE — 43239 EGD BIOPSY SINGLE/MULTIPLE: CPT | Performed by: SURGERY

## 2024-06-21 PROCEDURE — 2500000004 HC RX 250 GENERAL PHARMACY W/ HCPCS (ALT 636 FOR OP/ED): Performed by: STUDENT IN AN ORGANIZED HEALTH CARE EDUCATION/TRAINING PROGRAM

## 2024-06-21 PROCEDURE — 2720000007 HC OR 272 NO HCPCS: Performed by: ANESTHESIOLOGY

## 2024-06-21 PROCEDURE — 2500000004 HC RX 250 GENERAL PHARMACY W/ HCPCS (ALT 636 FOR OP/ED): Performed by: NURSE ANESTHETIST, CERTIFIED REGISTERED

## 2024-06-21 PROCEDURE — 3600000002 HC OR TIME - INITIAL BASE CHARGE - PROCEDURE LEVEL TWO: Performed by: ANESTHESIOLOGY

## 2024-06-21 RX ORDER — ONDANSETRON HYDROCHLORIDE 2 MG/ML
4 INJECTION, SOLUTION INTRAVENOUS ONCE AS NEEDED
Status: DISCONTINUED | OUTPATIENT
Start: 2024-06-21 | End: 2024-06-22 | Stop reason: HOSPADM

## 2024-06-21 RX ORDER — ACETAMINOPHEN 325 MG/1
650 TABLET ORAL EVERY 4 HOURS PRN
Status: DISCONTINUED | OUTPATIENT
Start: 2024-06-21 | End: 2024-06-22 | Stop reason: HOSPADM

## 2024-06-21 RX ORDER — SODIUM CHLORIDE, SODIUM LACTATE, POTASSIUM CHLORIDE, CALCIUM CHLORIDE 600; 310; 30; 20 MG/100ML; MG/100ML; MG/100ML; MG/100ML
20 INJECTION, SOLUTION INTRAVENOUS CONTINUOUS
Status: DISCONTINUED | OUTPATIENT
Start: 2024-06-21 | End: 2024-06-22 | Stop reason: HOSPADM

## 2024-06-21 RX ORDER — SODIUM CHLORIDE, SODIUM LACTATE, POTASSIUM CHLORIDE, CALCIUM CHLORIDE 600; 310; 30; 20 MG/100ML; MG/100ML; MG/100ML; MG/100ML
100 INJECTION, SOLUTION INTRAVENOUS CONTINUOUS
Status: DISCONTINUED | OUTPATIENT
Start: 2024-06-21 | End: 2024-06-22 | Stop reason: HOSPADM

## 2024-06-21 RX ORDER — LIDOCAINE HYDROCHLORIDE 10 MG/ML
0.1 INJECTION, SOLUTION EPIDURAL; INFILTRATION; INTRACAUDAL; PERINEURAL ONCE
Status: DISCONTINUED | OUTPATIENT
Start: 2024-06-21 | End: 2024-06-22 | Stop reason: HOSPADM

## 2024-06-21 RX ORDER — LIDOCAINE HCL/PF 100 MG/5ML
SYRINGE (ML) INTRAVENOUS AS NEEDED
Status: DISCONTINUED | OUTPATIENT
Start: 2024-06-21 | End: 2024-06-21

## 2024-06-21 RX ORDER — PROPOFOL 10 MG/ML
INJECTION, EMULSION INTRAVENOUS AS NEEDED
Status: DISCONTINUED | OUTPATIENT
Start: 2024-06-21 | End: 2024-06-21

## 2024-06-21 SDOH — HEALTH STABILITY: MENTAL HEALTH: CURRENT SMOKER: 0

## 2024-06-21 ASSESSMENT — PAIN - FUNCTIONAL ASSESSMENT
PAIN_FUNCTIONAL_ASSESSMENT: 0-10
PAIN_FUNCTIONAL_ASSESSMENT: 0-10

## 2024-06-21 ASSESSMENT — PAIN SCALES - GENERAL
PAINLEVEL_OUTOF10: 0 - NO PAIN
PAIN_LEVEL: 2
PAINLEVEL_OUTOF10: 4

## 2024-06-21 ASSESSMENT — COLUMBIA-SUICIDE SEVERITY RATING SCALE - C-SSRS
2. HAVE YOU ACTUALLY HAD ANY THOUGHTS OF KILLING YOURSELF?: NO
6. HAVE YOU EVER DONE ANYTHING, STARTED TO DO ANYTHING, OR PREPARED TO DO ANYTHING TO END YOUR LIFE?: NO
1. IN THE PAST MONTH, HAVE YOU WISHED YOU WERE DEAD OR WISHED YOU COULD GO TO SLEEP AND NOT WAKE UP?: NO

## 2024-06-21 NOTE — PROGRESS NOTES
BARIATRIC SURGERY CLINIC  FOLLOW UP NOTE    Date of Surgery: 2018   Surgical Procedure: Laparoscopic lois en y gastric bypass 93987  Extra Procedures: None      Initial weight: 359 lb  Pre-surgical weight: 317 lb  Today's Visit: ***        Wt Readings from Last 1 Encounters:   03/04/24 103 kg (228 lb)    There is no height or weight on file to calculate BMI.   Total weight loss: 135 lb     Surgeon: Rosy Handley MD    PROVIDER IMPRESSION LAST FUV 4/10/24:  TODAY'S PROVIDER VISIT IMPRESSIONS:46 yo male post rygbp was having nausea and burning after eating. he was explored and noted to have an internal hernia. Since repair the nausea is better. He only complains of ongoing burning. He is on omeprazole. Notes meat and crackers make it worse. Avoids fish due to gout. We discussed maybe stopping tumeric. He says stopped for a month with no relief. At this point there is no evidence of actual reflux. We discussed he may have functional heartburn. We discussed trying meditation or yoga as well. He notes he also had difficult to control heartburn prior to surgery and was difficult to treat even with high dose ppi's.      We discussed trying adriana and other natual antinausea meds, and eating 3-5 times/day. Also advised GI to check his rectum due to the findings on CT. He notes so hard to find things to eat. Advised to cook meats moist. limited on fish due to his gout. At this time I see no surgical issues.   I last saw him in 2022 and last 2021 was egd.  All normal at that time.  Most recently ongoing pain with eating. Egd done by gi shows ulceration and stricture at GJ.  Denies nsaid's and smoking and vaping.   Will add carafate.  Med list reviewed,  He is not able to eat meat.  Pain with eating all of the time.  Will see how egd is before considering next steps.        A/P:   Take carafate 4 times/day  Make the pills into a slurry.  I will scope you in a month, with a possible dilation.      PROVIDER IMPRESSIONS LAST FUV  5/2022 Dr. Handley:  46 yo male post rygbp was having nausea and burning after eating. he was explored and noted to have an internal hernia. Since repair the nausea is better. He only complains of ongoing burning. He is on omeprazole. Notes meat and crackers make it worse. Avoids fish due to gout. We discussed maybe stopping tumeric. He says stopped for a month with no relief. At this point there is no evidence of actual reflux. We discussed he may have functional heartburn. We discussed trying meditation or yoga as well. He notes he also had difficult to control heartburn prior to surgery and was difficult to treat even with high dose ppi's.      We discussed trying adriana and other natual antinausea meds, and eating 3-5 times/day. Also advised GI to check his rectum due to the findings on CT. He notes so hard to find things to eat. Advised to cook meats moist. limited on fish due to his gout. At this time I see no surgical issues.   Chief Complaint  The patient is being seen for follow up. Type of surgery:. Surgery date: .   follow up appt c/o abdominal pain 3/31/22      Provider Impressions 3/11/24 Dr. Chery: EGD/colonoscopy performed today 3/11/2024 at the [facility] ([facility] [place])     EGD (5/8/24):  Result Text   Impression  The upper third of the esophagus, middle third of the esophagus and lower third of the esophagus appeared normal.  The gastric pouch appeared normal.  Dilated with balloon dilator to 15 mm end size. Dilation caused mucosal tears and improved passage of the scope; post-dilation mucosal tears were superficial  Signs of previous surgery in the gastrojejunal anastomosis  The jejunum appeared normal.  The gastrojejunal anastomosis appeared normal.  Mild ulcerated mucosa in the gastrojejunal anastomosis  RYGB anatomy     Findings  The upper third of the esophagus, middle third of the esophagus and lower third of the esophagus appeared normal.  Regular Z-line 44 cm from the  incisors  The gastric pouch appeared normal. No bile. No gastrogastric fistula.  Dilated with balloon dilator from 12 mm starting size to 15 mm end size with step size of 2 mm. Dilation caused mucosal tears and improved passage of the scope; post-dilation mucosal tears were superficial  Signs of previous surgery in the gastrojejunal anastomosis  The jejunum appeared normal. No bile  The gastrojejunal anastomosis appeared normal. Z-line is 50 cm from the incisors.  Mild, localized ulcerated mucosa in the gastrojejunal anastomosis;     No bile, no gastrogastric fistula.   Pouch size 44 to 50 (z line to GJ)  Balloon dilation done from 12 to 15mm end size     Recommendation    Follow up with me in clinic     Repeat EGD in 1 month     Continue PPI and curafate  Follow up within a month for EGD and possible repeat dilation   Try zofran half hour before meals to help with nausea              Normal; Upper Third of Esophagus    Normal; Middle Third of Esophagus    Z-line      Z-line    Normal; Gastric Pouch; 5 cm    Normal; Gastric Pouch      Normal; Gastric Pouch    Normal; Gastric Pouch    Signs of previous surgery; Normal; Gastrojejunal Anastomosis      Signs of previous surgery; Normal; Gastrojejunal Anastomosis    Signs of previous surgery; ulcerated mucosa; Gastrojejunal Anastomosis; 2 mm ulcer    Normal; ulcerated mucosa; Gastrojejunal Anastomosis      Normal; Jejunum    Normal; Jejunum    Signs of previous surgery; Normal; Gastrojejunal Anastomosis      Normal; Gastric Pouch    Normal; Gastric Pouch    Empiric dilation      Empiric dilation    Empiric dilation    Empiric dilation      Empiric dilation    Empiric dilation    Empiric dilation; bleeding post dilation      Empiric dilation; bleeding post dilation    Normal; Lower Third of Esophagus    Normal; Lower Third of Esophagus     Anatomical Diagram             HPI: ***    [facility] INTAKE: [facility]    Diet History:   Carbonated Beverages: ***  Time to eat  meals: ***  Fluid intake: *** oz/day  Breakfast: ***  Lunch: ***  Dinner: ***  Snacks: ***    DAILY SUPPLEMENTS:  Calcium: Calcium Citrate w/ vitamin D (1200 - 1500mg)  Multivitamin & Minerals: 2 per day  Iron Supplement: included in multi-vitamin  Vitamin B12: 1000 mcg  Vitamin D3: 3000 units  Other: ***  PPI:***    EXERCISE: ***    Symptoms:   Appetite lost: ***   Experiencing hunger: ***   Nausea/vomiting: ***   Food intolerance: ***   Constipation: ***   Diarrhea: ***   Experiencing dumping: ***   Abdominal pain: ***   Reflux: *** Are you on medications: ***      Current Outpatient Medications   Medication Sig Dispense Refill    allopurinol (Zyloprim) 100 mg tablet Take 4 tablets (400 mg) by mouth once daily. 360 tablet 3    cyclobenzaprine (Fexmid) 7.5 mg tablet Take 1 tablet (7.5 mg) by mouth 3 times a day as needed for muscle spasms. 60 tablet 2    diclofenac sodium 1 % kit Apply 4 g topically every 12 hours if needed (Pain).      famotidine (Pepcid) 40 mg tablet Take 1 tablet (40 mg) by mouth 2 times a day. 180 tablet 3    metaxalone (Skelaxin) 800 mg tablet Take 1 tablet (800 mg) by mouth 2 times a day. 30 tablet 3    naloxone (Narcan) 4 mg/0.1 mL nasal spray Administer 1 spray (4 mg) into affected nostril(s) if needed for opioid reversal. 2 each 0    omeprazole (PriLOSEC) 40 mg DR capsule TAKE 1 CAPSULE BY MOUTH 2 TIMES A DAY.  OPEN CAPSULE AND SPRINKLE IN SUGAR FREE APPLESAUCE OR PUDDING 180 capsule 0    oxyCODONE-acetaminophen (Percocet) 5-325 mg tablet Take 1 tablet by mouth every 6 hours if needed for severe pain (7 - 10). 60 tablet 0    sucralfate (Carafate) 1 gram tablet Take 1 tablet (1 g) by mouth 4 times a day before meals. Take 1 Tab by mouth on an empty stomach four times daily:  before meals and at bed time x 30 days.  May make into a slurry with small amount of water. 120 tablet 2    sucralfate (Carafate) 100 mg/mL suspension Take 10 mL (1 g) by mouth 4 times a day. 1200 mL 0     No current  facility-administered medications for this visit.       Comorbidities:  No problem-specific Assessment & Plan notes found for this encounter.        REVIEW OF SYSTEMS:  CONSTITUTIONAL: Patient denies fevers, chills, sweats and weight changes.  EYES: Patient denies any visual symptoms.  EARS, NOSE, AND THROAT: No difficulties with hearing. No symptoms of rhinitis or sore throat.  CARDIOVASCULAR: Patient denies chest pains, palpitations, orthopnea and paroxysmal nocturnal dyspnea.  RESPIRATORY: No dyspnea on exertion, no wheezing or cough.  GI: No nausea, vomiting, diarrhea, constipation, abdominal pain, hematochezia or melena.  : No urinary hesitancy or dribbling. No nocturia or urinary frequency. No abnormal urethral discharge.  MUSCULOSKELETAL: No myalgias or arthralgias.  NEUROLOGIC: No chronic headaches, no seizures. Patient denies numbness, tingling or weakness.  PSYCHIATRIC: Patient denies problems with mood disturbance. No problems with anxiety.  ENDOCRINE: No excessive urination or excessive thirst.  DERMATOLOGIC: Patient denies any rashes or skin changes.    PHYSICAL EXAM:  There were no vitals taken for this visit.  GENERAL: Obese. No apparent distress. Pt is alert and oriented x3.  HEENT: Head is normocephalic and atraumatic. Extraocular muscles are intact. Pupils are equal, round, and reactive to light and accommodation. Nares appeared normal. Mouth is well hydrated and without lesions. Mucous membranes are moist. Posterior pharynx clear of any exudate or lesions.  NECK: Supple. No carotid bruits. No lymphadenopathy or thyromegaly.  LUNGS: Clear to auscultation.  HEART: Regular rate and rhythm without murmur.  ABDOMEN: Soft, nontender, and nondistended. Positive bowel sounds. No hepatosplenomegaly was noted.  EXTREMITIES: Without any cyanosis, clubbing, rash, lesions or edema.  NEUROLOGIC: Cranial nerves II through XII are grossly intact.  PSYCHIATRIC: Flat affect, but denies suicidal or homicidal  ideations.  SKIN: No ulceration or induration present.      PROVIDER IMPRESSION:    {KIRTIfollow up assessment:68499}    Energy: {LAKenergy:84504}    Weight loss: {LAKwt loss:51071}    Recommendations: {KIRTI follow up recommendations:95814}    Follow up: {Narendra up:47091}

## 2024-06-21 NOTE — ANESTHESIA PREPROCEDURE EVALUATION
Patient: Lito Unger    Procedure Information       Anesthesia Start Date/Time: 06/21/24 1316    Scheduled providers: Rosy Handley MD MPH    Procedure: EGD    Location: [facility] OR     Vitals:    06/21/24 1420   BP: 131/86   Pulse: 54   Resp: 16   Temp:    SpO2: 97%       Past Surgical History:   Procedure Laterality Date    APPENDECTOMY  09/17/2013    Laparoscopic Appendectomy    GASTRIC BYPASS  08/21/2018    Gastric Surgery For Morbid Obesity Bypass With Neymar-en-Y    KNEE CARTILAGE SURGERY Left     OTHER SURGICAL HISTORY  10/27/2014    Incisional Hernia Repair    SHOULDER SURGERY  10/27/2014    Shoulder Surgery    TONSILLECTOMY  10/27/2014    Tonsillectomy    UPPER GASTROINTESTINAL ENDOSCOPY N/A      Past Medical History:   Diagnosis Date    Abnormal weight loss 10/16/2018    Rapid weight loss    Acute bronchospasm 02/23/2018    Post-infection bronchospasm    Anesthesia of skin     Numbness, limb    Anxiety disorder, unspecified 08/30/2015    Anxiety and depression    Biomechanical lesion, unspecified 04/16/2015    Nonallopathic lesion of rib cage    Body mass index (BMI) 38.0-38.9, adult 07/05/2018    BMI 38.0-38.9,adult    Carpal tunnel syndrome, bilateral upper limbs 05/23/2017    Bilateral carpal tunnel syndrome    Chronic gout, unspecified, without tophus (tophi) 09/03/2019    Chronic gout    Cramp and spasm 09/08/2017    Muscle cramping    Dietary counseling and surveillance     Pre-bariatric surgery nutrition evaluation    Idiopathic gout, right hand 10/14/2019    Idiopathic gout of right hand, unspecified chronicity    Morbid (severe) obesity due to excess calories (Multi) 05/31/2017    Obesity, morbid, BMI 40.0-49.9    Other acute postprocedural pain     Post-op pain    Other intervertebral disc degeneration, lumbar region 04/19/2016    Other intervertebral disc degeneration, lumbar region    Other specified anxiety disorders 05/31/2017    Other specified anxiety disorders    Other  symptoms and signs involving the genitourinary system 11/04/2016    Bladder pain    Pain in leg, unspecified 09/15/2013    Lower extremity pain    Personal history of other benign neoplasm 06/27/2019    History of other benign neoplasm    Personal history of other diseases of the circulatory system 09/26/2013    History of hypertension    Personal history of other diseases of the digestive system 10/08/2015    History of esophageal reflux    Personal history of other diseases of the digestive system 08/01/2018    History of umbilical hernia    Personal history of other diseases of the musculoskeletal system and connective tissue 12/19/2016    History of joint pain    Personal history of other diseases of the musculoskeletal system and connective tissue 09/01/2017    History of back pain    Personal history of other diseases of the musculoskeletal system and connective tissue 09/08/2017    History of muscle spasm    Personal history of other diseases of the nervous system and sense organs     History of sleep apnea    Personal history of other diseases of the respiratory system 01/07/2020    History of sinusitis    Personal history of other diseases of urinary system 11/04/2016    History of hematuria    Personal history of other diseases of urinary system 08/02/2021    History of hematuria    Personal history of other endocrine, nutritional and metabolic disease 05/16/2017    History of hypokalemia    Personal history of other endocrine, nutritional and metabolic disease 04/16/2015    History of hypokalemia    Personal history of other endocrine, nutritional and metabolic disease     History of dehydration    Personal history of other mental and behavioral disorders     History of depression    Personal history of other specified conditions 12/07/2015    History of paresthesia    Personal history of other specified conditions 10/29/2014    History of weight gain    Personal history of other specified conditions  10/29/2015    History of headache    Personal history of other specified conditions     History of heartburn    Personal history of other specified conditions 02/23/2018    History of abdominal pain    Personal history of other specified conditions 03/11/2017    History of polyuria    Personal history of other specified conditions 11/04/2016    History of gross hematuria    Personal history of other specified conditions 07/21/2021    History of dysuria    Personal history of other specified conditions 07/21/2021    History of left flank pain    Personal history of other specified conditions 10/24/2016    History of dysuria    Personal history of other specified conditions 11/12/2013    History of breast lump    Personal history of other specified conditions 02/23/2018    History of abdominal pain    Personal history of urinary (tract) infections 09/26/2013    History of cystitis    Snoring     Snoring    Sprain of ligaments of lumbar spine, initial encounter 12/19/2016    Low back sprain    Strain of muscle, fascia and tendon at neck level, initial encounter 12/07/2018    Cervical strain    Unspecified disturbances of skin sensation 10/14/2019    Paresthesias/numbness       Current Outpatient Medications:     allopurinol (Zyloprim) 100 mg tablet, Take 4 tablets (400 mg) by mouth once daily., Disp: 360 tablet, Rfl: 3    cyclobenzaprine (Fexmid) 7.5 mg tablet, Take 1 tablet (7.5 mg) by mouth 3 times a day as needed for muscle spasms., Disp: 60 tablet, Rfl: 2    famotidine (Pepcid) 40 mg tablet, Take 1 tablet (40 mg) by mouth 2 times a day., Disp: 180 tablet, Rfl: 3    metaxalone (Skelaxin) 800 mg tablet, Take 1 tablet (800 mg) by mouth 2 times a day., Disp: 30 tablet, Rfl: 3    omeprazole (PriLOSEC) 40 mg DR capsule, TAKE 1 CAPSULE BY MOUTH 2 TIMES A DAY.  OPEN CAPSULE AND SPRINKLE IN SUGAR FREE APPLESAUCE OR PUDDING, Disp: 180 capsule, Rfl: 0    oxyCODONE-acetaminophen (Percocet) 5-325 mg tablet, Take 1 tablet by  mouth every 6 hours if needed for severe pain (7 - 10)., Disp: 60 tablet, Rfl: 0    sucralfate (Carafate) 1 gram tablet, Take 1 tablet (1 g) by mouth 4 times a day before meals. Take 1 Tab by mouth on an empty stomach four times daily:  before meals and at bed time x 30 days.  May make into a slurry with small amount of water., Disp: 120 tablet, Rfl: 2    naloxone (Narcan) 4 mg/0.1 mL nasal spray, Administer 1 spray (4 mg) into affected nostril(s) if needed for opioid reversal., Disp: 2 each, Rfl: 0    sucralfate (Carafate) 100 mg/mL suspension, Take 10 mL (1 g) by mouth 4 times a day., Disp: 1200 mL, Rfl: 0    Current Facility-Administered Medications:     acetaminophen (Tylenol) tablet 650 mg, 650 mg, oral, q4h PRN, Carey Cano MD    lactated Ringer's infusion, 20 mL/hr, intravenous, Continuous, Kendall Colby MD, Last Rate: 20 mL/hr at 06/21/24 1316, Continued by Anesthesia at 06/21/24 1316    lactated Ringer's infusion, 100 mL/hr, intravenous, Continuous, Carey Cano MD    lidocaine PF (Xylocaine) 10 mg/mL (1 %) injection 1 mg, 0.1 mL, subcutaneous, Once, Carey Cano MD    ondansetron (Zofran) injection 4 mg, 4 mg, intravenous, Once PRN, Carey Cano MD    oxygen (O2) therapy, , inhalation, Continuous PRN - O2/gases, Carey Cano MD    promethazine (Phenergan) 6.25 mg in sodium chloride 0.9% 50 mL IV, 6.25 mg, intravenous, Once PRN, Carey Cano MD  Prior to Admission medications    Medication Sig Start Date End Date Taking? Authorizing Provider   allopurinol (Zyloprim) 100 mg tablet Take 4 tablets (400 mg) by mouth once daily. 10/26/23  Yes Jeannie Sanders DO   cyclobenzaprine (Fexmid) 7.5 mg tablet Take 1 tablet (7.5 mg) by mouth 3 times a day as needed for muscle spasms. 1/26/24  Yes Jeannie Sanders, DO   famotidine (Pepcid) 40 mg tablet Take 1 tablet (40 mg) by mouth 2 times a day. 4/25/24 4/25/25 Yes Jeannie Sanders DO   metaxalone (Skelaxin) 800 mg tablet Take 1 tablet (800 mg) by mouth 2 times  a day. 5/31/24  Yes Jeannie Sanders DO   omeprazole (PriLOSEC) 40 mg DR capsule TAKE 1 CAPSULE BY MOUTH 2 TIMES A DAY.  OPEN CAPSULE AND SPRINKLE IN SUGAR FREE APPLESAUCE OR PUDDING 6/5/24  Yes Jeannie Sanders DO   oxyCODONE-acetaminophen (Percocet) 5-325 mg tablet Take 1 tablet by mouth every 6 hours if needed for severe pain (7 - 10). 6/4/24 7/4/24 Yes Jeannie Sanders DO   sucralfate (Carafate) 1 gram tablet Take 1 tablet (1 g) by mouth 4 times a day before meals. Take 1 Tab by mouth on an empty stomach four times daily:  before meals and at bed time x 30 days.  May make into a slurry with small amount of water. 4/13/24 7/12/24 Yes Kendall Colby MD   naloxone (Narcan) 4 mg/0.1 mL nasal spray Administer 1 spray (4 mg) into affected nostril(s) if needed for opioid reversal. 1/26/24   Jeannie Sanders DO   sucralfate (Carafate) 100 mg/mL suspension Take 10 mL (1 g) by mouth 4 times a day. 6/4/24 7/4/24  Jeannie Sanders DO   diclofenac sodium 1 % kit Apply 4 g topically every 12 hours if needed (Pain). 9/30/15 6/21/24  Historical Provider, MD     Allergies   Allergen Reactions    Ciprofloxacin Tinnitus    Levofloxacin Tinnitus    Nsaids (Non-Steroidal Anti-Inflammatory Drug) Other    Adhesive Tape-Silicones Rash     Clear/transparent tape only and some bandaides    Gloves, Latex With Aloe Vera Rash    Latex Rash     Social History     Tobacco Use    Smoking status: Never    Smokeless tobacco: Never   Substance Use Topics    Alcohol use: Not Currently         Chemistry    Lab Results   Component Value Date/Time     03/04/2024 1242    K 4.7 03/04/2024 1242     03/04/2024 1242    CO2 34 (H) 03/04/2024 1242    BUN 5 (L) 03/04/2024 1242    CREATININE 0.83 03/04/2024 1242    Lab Results   Component Value Date/Time    CALCIUM 10.1 03/04/2024 1242    ALKPHOS 78 03/04/2024 1242    AST 23 03/04/2024 1242    ALT 19 03/04/2024 1242    BILITOT 0.6 03/04/2024 1242          Lab Results   Component Value Date/Time    WBC 7.1  03/04/2024 1242    HGB 13.8 03/04/2024 1242    HCT 43.6 03/04/2024 1242     03/04/2024 1242     Lab Results   Component Value Date/Time    PROTIME 11.1 03/30/2021 1006    INR 1.0 03/30/2021 1006     No results found for this or any previous visit (from the past 4464 hour(s)).  No results found for this or any previous visit from the past 1095 days.        Relevant Problems   Cardiac   (+) Primary hypertension      GI   (+) Esophageal reflux      Endocrine   (+) Morbid obesity (Multi)      Musculoskeletal   (+) Facet degeneration of lumbar region   (+) Gout, arthropathy   (+) Lumbar degenerative disc disease   (+) Lumbar spondylosis   (+) Other intervertebral disc degeneration, lumbosacral region       Clinical information reviewed:   Tobacco  Allergies  Meds   Med Hx  Surg Hx   Fam Hx  Soc Hx        NPO Detail:  NPO/Void Status  Date of Last Liquid: 06/20/24  Date of Last Solid: 06/20/24         PHYSICAL EXAM    Anesthesia Plan    History of general anesthesia?: yes  History of complications of general anesthesia?: no    ASA 2     MAC     The patient is not a current smoker.    Anesthetic plan and risks discussed with patient.  Use of blood products discussed with patient who.    Plan discussed with CRNA and attending.

## 2024-06-21 NOTE — H&P
Today notes a lot of stomach pain.  Had an [facility] endoscopy: GI told patient in the pouch, outlet very tight.  Too much scar.  He saw an ulcer there.  Takes the omeprazole and if gets more heartburn than usual uses pepcid.  Most problem is trouble with eating. Not usung any nsaid's.  Not smoking or vaping.  Went through some time for a year with no heartburn.  Still could not eat.  He feels nausea since the surgery. Pain in stomach.      PROVIDER IMPRESSIONS LAST FUV 5/2022 Dr. Handley:  46 yo male post rygbp was having nausea and burning after eating. he was explored and noted to have an internal hernia. Since repair the nausea is better. He only complains of ongoing burning. He is on omeprazole. Notes meat and crackers make it worse. Avoids fish due to gout. We discussed maybe stopping tumeric. He says stopped for a month with no relief. At this point there is no evidence of actual reflux. We discussed he may have functional heartburn. We discussed trying meditation or yoga as well. He notes he also had difficult to control heartburn prior to surgery and was difficult to treat even with high dose ppi's.      We discussed trying adriana and other natual antinausea meds, and eating 3-5 times/day. Also advised GI to check his rectum due to the findings on CT. He notes so hard to find things to eat. Advised to cook meats moist. limited on fish due to his gout. At this time I see no surgical issues.   Chief Complaint  The patient is being seen for follow up. Type of surgery:. Surgery date: .   follow up appt c/o abdominal pain 3/31/22      Provider Impressions 3/11/24 Dr. Chery: EGD/colonoscopy performed today 3/11/2024 at the [facility] ([facility])         Media Information                  Media Information              FINAL DIAGNOSIS   A. STOMACH ANTRUM BIOPSY:   Mild reactive gastritis/gastropathy  No H. pylori organisms are identified            CT Abdomen/Pelvis 5/2022 Dr. Handley:    FINDINGS:  LOWER CHEST:  Bibasilar atelectasis.     ABDOMEN:     LIVER:  11 mm focal hypoattenuation anteriorly about the falciform ligament  probably focal fatty deposition. 8 mm low-density lesion right  hepatic lobe is too small to characterize.     BILE DUCTS:  No ductal dilatation.     GALLBLADDER:  No calcified gallstones.     SPLEEN:  Within normal limits.     PANCREAS:  Within normal limits.     ADRENAL GLANDS:  Within normal limits.     KIDNEYS AND URETERS:  No definite renal stones or hydronephrosis. Nonspecific perinephric  stranding bilaterally right greater than left.     BOWEL:  Evidence of gastric bypass surgery. There is some fluid in the  excluded stomach. Mildly prominent fluid-filled small bowel loops  with scattered air-fluid levels throughout. There is some probable  postoperative changes about the cecum. Prominent colonic stool. There  is rectal wall thickening.     PERITONEUM/RETROPERITONEUM/LYMPH NODES:  No free air.  No free fluid or focal collection.  Mildly prominent  nonspecific mesenteric nodes..     VASCULAR:  Abdominal aorta is moderately atherosclerotic without aneurysm. IVC  is grossly patent.     PELVIS:     BLADDER:  Within normal limits.     REPRODUCTIVE ORGANS:  Mildly enlarged prostate gland.     ABDOMINAL WALL:  Within normal limits.     BONES:  No suspicious osseous lesions.  Degenerative changes visualized spine.     ADDITIONAL FINDINGS:  Focal density retroareolar left breast probably due to gynecomastia  but otherwise suboptimally med in this examination.     IMPRESSION:  Evidence of gastric bypass surgery. There is some fluid in the  excluded stomach of uncertain significance. Mildly prominent  fluid-filled small bowel loops scattered throughout with some  scattered air-fluid levels could reflect nonspecific ileus/enteritis.  Clinical correlation and follow-up suggested.     Distal colonic/rectal wall thickening with inflammatory or malignant  involvement not excluded  and for which close clinical correlation and  follow-up advised.     Hepatic hypoattenuating foci as described about the falciform  ligament which could reflect focal fatty deposition and also too  small to characterize low-density lesion right hepatic lobe. Consider  dedicated ultrasound for further assessment.     Focal density retroareolar region left breast probably due to  gynecomastia. Clinical correlation recommended.     Mildly enlarged prostate gland.     Additional findings as above.     PRESENTING TODAY FOR BARIATRIC POST-OP VISIT:  Visit: 7  years   -  Self Reports Concerns: EGD attempted on 3/11/24 with Dr. 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    Symptoms:    Abdominal pain:  Yes            Constipation: Yes    Diarrhea: No    Dumping Syndrome:  Yes    Experiencing hunger: No    Food Intolerances: No    Nausea/vomiting: Yes- nausea after eating    Reflux: No   Are you on medications: Yes                            Diet History:     Carbonated Beverages: No          Caffeinated Beverages: Yes    Time to eat meals: 40 min    Fluid intake: 64 oz/day      Protein Intake:  40-50 grams/day    Breakfast: eggs    Lunch: doesn't eat    Dinner: bite of meat, potatoes, vegetables    Snacks: cheese, chips, nuts     EXERCISE:  No           GERD - Health Related Quality of Life Questionnaire (GERD- HRQL)  On PPI     How bad is the heartburn? 0 = No symptoms  Heartburn when lying down? 0 = No symptoms  Heartburn when standing up? 0 = No symptoms  Heartburn after meals? 0 = No symptoms  Does heartburn change your diet? 0 = No symptoms  Does heartburn wake you from sleep? 0 = No symptoms     Do you have difficulty swallowing? 0 = No symptoms  Do you have pain with swallowing? 0 = No symptoms  If you take medication, does this affect your daily life? 0 = No symptoms     How bad is the regurgitation? 0 = No symptoms  Regurgitation when lying down? 0 = No  symptoms  Regurgitation when standing up? 0 = No symptoms  Regurgitation after meals? 0 = No symptoms  Does regurgitation change your diet? 0 = No symptoms  Does regurgitation wake you from sleep? 0 = No symptoms     How satisfied are you with your present condition? Satisfied     Total score (calculated by summing the individual scores of questions 1-15): 0              Greatest possible score 75 (worst symptoms).              Lowest possible score 0 (no symptoms).     Heartburn score (calculated by summing the individual scores of questions 1-6): 0              Worst heartburn symptoms: 30.              No heartburn symptoms: 0.              Score less than or equal to 12 with each individual question not exceeding 2 indicate heartburn elimination.     Regurgitation score (calculated by summing the individual scores of questions 10-15): 0              Worst regurgitation symptoms: 30.              No regurgitation symptoms: 0.              Score less than or equal to 12 with each individual question not exceeding 2 indicate regurgitation elimination.      Last Visit:          Wt Readings from Last 3 Encounters:   03/04/24 103 kg (228 lb)   01/26/24 102 kg (225 lb)   10/26/23 104 kg (230 lb)               HPI: s/p rygb 2018 with epigastric abd pain and nausea after eating. EGD with Dr. Chery last Monday, unable to be performed d/t to stenosis, has bleeding ulcer.

## 2024-06-21 NOTE — ANESTHESIA POSTPROCEDURE EVALUATION
Patient: Lito Unger    Procedure Summary       Date: 06/21/24 Room / Location: [facility] OR    Anesthesia Start: 1316 Anesthesia Stop: 1352    Procedure: EGD Diagnosis:       Status post bariatric surgery      Epigastric pain      Chronic right upper quadrant pain      Gastroesophageal reflux disease, unspecified whether esophagitis present      Dyspepsia      Ulcer jejunum    Scheduled Providers: Rosy Handley MD MPH Responsible Provider: David Cardenas MD    Anesthesia Type: MAC ASA Status: 2            Anesthesia Type: MAC    Vitals Value Taken Time   /86 06/21/24 1420   Temp 36.2 °C (97.2 °F) 06/21/24 1349   Pulse 54 06/21/24 1420   Resp 16 06/21/24 1420   SpO2 97 % 06/21/24 1420       Anesthesia Post Evaluation    Patient location during evaluation: PACU  Patient participation: complete - patient participated  Level of consciousness: awake  Pain score: 2  Pain management: adequate  Multimodal analgesia pain management approach  Airway patency: patent  Two or more strategies used to mitigate risk of obstructive sleep apnea  Cardiovascular status: acceptable  Respiratory status: acceptable  Hydration status: acceptable  Postoperative Nausea and Vomiting: none    No notable events documented.

## 2024-06-26 ENCOUNTER — APPOINTMENT (OUTPATIENT)
Dept: SURGERY | Facility: CLINIC | Age: 49
End: 2024-06-26
Payer: COMMERCIAL

## 2024-06-26 DIAGNOSIS — Z98.84 STATUS POST GASTRIC BYPASS FOR OBESITY: ICD-10-CM

## 2024-06-26 DIAGNOSIS — R11.0 NAUSEA: ICD-10-CM

## 2024-06-26 RX ORDER — OXYCODONE AND ACETAMINOPHEN 5; 325 MG/1; MG/1
1-2 TABLET ORAL EVERY 6 HOURS PRN
Qty: 60 TABLET | Refills: 0 | Status: SHIPPED | OUTPATIENT
Start: 2024-06-26 | End: 2024-07-26

## 2024-06-26 RX ORDER — CYCLOBENZAPRINE HYDROCHLORIDE 7.5 MG/1
7.5 TABLET, FILM COATED ORAL 3 TIMES DAILY PRN
Qty: 60 TABLET | Refills: 2 | Status: SHIPPED | OUTPATIENT
Start: 2024-06-26

## 2024-06-26 RX ORDER — OMEPRAZOLE 40 MG/1
40 CAPSULE, DELAYED RELEASE ORAL
Qty: 180 CAPSULE | Refills: 3 | Status: SHIPPED | OUTPATIENT
Start: 2024-06-26

## 2024-06-26 NOTE — ASSESSMENT & PLAN NOTE
Uncontrolled today.  Continue medications at current dosage - monitor BP at home and Follow up is scheduled for 3 months -sooner if BP persistently high.

## 2024-06-26 NOTE — ASSESSMENT & PLAN NOTE
Regimen unchanged.  The OARRS website was checked and validated.  I have personally reviewed the OARRS report for this patient.  This report has been scanned into the electronic medical record.  I have considered the risks of abuse,, dependence, addiction and diversion.  I believe that it is clinically appropriate for this patient to be prescribed this medication.  The patient has been told not to increase dose or refill sooner than indicated.  Any variation from this practice will results in my denying further prescriptions and possible discharge from care.

## 2024-06-26 NOTE — ASSESSMENT & PLAN NOTE
Not significantly improved - will try to order Carafate liquid.  Continue PPI at current dosage as per recommendation of Dr. Handley.  Follow up at least annually - soner with any problems or change in symptoms.

## 2024-06-26 NOTE — ASSESSMENT & PLAN NOTE
Unchanged but stable on current regimen.  Encouraging tentative follow up with pain management for possible injections with lumbar radiculopathy persisting.  The OARRS website was checked and validated.  I have personally reviewed the OARRS report for this patient.  This report has been scanned into the electronic medical record.  I have considered the risks of abuse,, dependence, addiction and diversion.  I believe that it is clinically appropriate for this patient to be prescribed this medication.  The patient has been told not to increase dose or refill sooner than indicated.  Any variation from this practice will results in my denying further prescriptions and possible discharge from care.  Follow up 3 months.

## 2024-06-26 NOTE — ASSESSMENT & PLAN NOTE
Stable on current regimen - encouraging to follow up with pain management to discuss nerve ablation or other treatment options for chronic pain.    The OARRS website was checked and validated.  I have personally reviewed the OARRS report for this patient.  This report has been scanned into the electronic medical record.  I have considered the risks of abuse,, dependence, addiction and diversion.  I believe that it is clinically appropriate for this patient to be prescribed this medication.  The patient has been told not to increase dose or refill sooner than indicated.  Any variation from this practice will results in my denying further prescriptions and possible discharge from care.   Follow up 3 months.

## 2024-07-01 LAB
LABORATORY COMMENT REPORT: NORMAL
PATH REPORT.FINAL DX SPEC: NORMAL
PATH REPORT.GROSS SPEC: NORMAL
PATH REPORT.TOTAL CANCER: NORMAL

## 2024-07-30 DIAGNOSIS — M47.816 FACET DEGENERATION OF LUMBAR REGION: ICD-10-CM

## 2024-07-30 DIAGNOSIS — M51.36 DISCOGENIC LOW BACK PAIN: ICD-10-CM

## 2024-07-30 DIAGNOSIS — G89.29 OTHER CHRONIC PAIN: ICD-10-CM

## 2024-07-30 RX ORDER — OXYCODONE AND ACETAMINOPHEN 5; 325 MG/1; MG/1
1-2 TABLET ORAL EVERY 6 HOURS PRN
Qty: 60 TABLET | Refills: 0 | Status: SHIPPED | OUTPATIENT
Start: 2024-07-30 | End: 2024-08-29

## 2024-08-07 NOTE — PROGRESS NOTES
BARIATRIC SURGICAL WEIGHT LOSS   POST DIAGNOSTIC TESTING FOLLOW UP VISIT    CLINIC DATE:  8/14/24    NAME: Lito Unger 48 y.o.  MRN: [id number]    Date of Surgery: 2018   Surgical Procedure: Laparoscopic lois en y gastric bypass 79007  Extra Procedures: None      Initial weight: 359 lb  Pre-surgical weight: 317 lb  Today's Visit: 224 lb    THIS VISIT WEIGHT / BMI:    Wt Readings from Last 1 Encounters:   06/21/24 102 kg (224 lb 6.9 oz)      BMI Readings from Last 1 Encounters:   06/21/24 28.05 kg/m²     WEIGHT / BMI HX:    Wt Readings from Last 3 Encounters:   06/21/24 102 kg (224 lb 6.9 oz)   05/31/24 103 kg (228 lb)   05/08/24 99.8 kg (220 lb)      BMI Readings from Last 3 Encounters:   06/21/24 28.05 kg/m²   05/31/24 28.50 kg/m²   05/08/24 27.50 kg/m²        TOTAL WEIGHT LOSS:  ***  LBS      PROVIDER LAST IMPRESSION VISIT NOTE 4/10/24: 46 yo male post rygbp was having nausea and burning after eating. he was explored and noted to have an internal hernia. Since repair the nausea is better. He only complains of ongoing burning. He is on omeprazole. Notes meat and crackers make it worse. Avoids fish due to gout. We discussed maybe stopping tumeric. He says stopped for a month with no relief. At this point there is no evidence of actual reflux. We discussed he may have functional heartburn. We discussed trying meditation or yoga as well. He notes he also had difficult to control heartburn prior to surgery and was difficult to treat even with high dose ppi's.      We discussed trying adriana and other natual antinausea meds, and eating 3-5 times/day. Also advised GI to check his rectum due to the findings on CT. He notes so hard to find things to eat. Advised to cook meats moist. limited on fish due to his gout. At this time I see no surgical issues.   I last saw him in 2022 and last 2021 was egd.  All normal at that time.  Most recently ongoing pain with eating. Egd done by gi shows ulceration and stricture at GJ.   Denies nsaid's and smoking and vaping.   Will add carafate.  Med list reviewed,  He is not able to eat meat.  Pain with eating all of the time.  Will see how egd is before considering next steps.        A/P:   Take carafate 4 times/day  Make the pills into a slurry.  I will scope you in a month, with a possible dilation.        PRESENTING FOR Bariatric Surgical Weight Loss Post Diagnostic Testing Results Review FUV: is a 48 y.o. adult who is 6 yrs S/P:  rygb & has completed previously ordered EGD.       TEST RESULTS:    EGD 5/8/24:  Impression  The upper third of the esophagus, middle third of the esophagus and lower third of the esophagus appeared normal.  The gastric pouch appeared normal.  Dilated with balloon dilator to 15 mm end size. Dilation caused mucosal tears and improved passage of the scope; post-dilation mucosal tears were superficial  Signs of previous surgery in the gastrojejunal anastomosis  The jejunum appeared normal.  The gastrojejunal anastomosis appeared normal.  Mild ulcerated mucosa in the gastrojejunal anastomosis  RYGB anatomy  Findings  The upper third of the esophagus, middle third of the esophagus and lower third of the esophagus appeared normal.  Regular Z-line 44 cm from the incisors  The gastric pouch appeared normal. No bile. No gastrogastric fistula.  Dilated with balloon dilator from 12 mm starting size to 15 mm end size with step size of 2 mm. Dilation caused mucosal tears and improved passage of the scope; post-dilation mucosal tears were superficial  Signs of previous surgery in the gastrojejunal anastomosis  The jejunum appeared normal. No bile  The gastrojejunal anastomosis appeared normal. Z-line is 50 cm from the incisors.  Mild, localized ulcerated mucosa in the gastrojejunal anastomosis;     No bile, no gastrogastric fistula.   Pouch size 44 to 50 (z line to GJ)  Balloon dilation done from 12 to 15mm end size     Recommendation    Follow up with me in clinic     Repeat EGD  in 1 month     Continue PPI and curafate  Follow up within a month for EGD and possible repeat dilation   Try zofran half hour before meals to help with nausea         EGD 6/21/24:  Impression  The esophagus appeared normal.  The gastric pouch appeared normal.  Mild ulcerated mucosa in the gastrojejunal anastomosis; performed cold forceps biopsy  The jejunum appeared normal.  Stricture in the gastrojejunal anastomosis; dilated to 15 mm end size  Findings  The esophagus appeared normal.  The gastric pouch appeared normal.  Z-line  Mild, localized ulcerated mucosa in the gastrojejunal anastomosis; performed cold forceps biopsy;  The jejunum appeared normal.  Benign-appearing stricture (traversable) in the gastrojejunal anastomosis; dilated with 8 mm long SkillBoost balloon dilator using fluoroscopic guidance from 12 mm starting size to 15 mm end size  Recommendation    Await pathology results     Follow up with me in clinic     Keep on taking omeprazole  Take carafate 4 times/day-make into a slurry  See me in a month  Follow the pouch rules:       FINAL DIAGNOSIS   STOMACH, BIOPSY:  - FOVEOLAR EPITHELIUM AND FIBRINOPURULENT EXUDATE, CONSISTENT WITH DERIVATION FROM ULCER; NO HELICOBACTER OR MALIGNANCY IDENTIFIED.       CURRENT SYMPTOMS:      Abdominal pain:  Yes            Constipation: Yes- sometimes    Diarrhea: No    Dumping Syndrome:  Yes    Experiencing hunger: No    Food Intolerances: Yes- meat, chicken, greasy food    Nausea/vomiting: Yes, always saliva, no food    Reflux:  Yes    Regurgitation:  Yes- saliva    Weight Regain: No       DIET HISTORY:       Carbonated Beverages: No          Caffeinated Beverages: Yes    Time to eat meals: several hours  Minutes    Fluid intake: 60 oz/day      Protein Intake:  20 grams/day    Breakfast: egg sandwich    Lunch: cheese    Dinner: green beans, sometimes chicken    Snacks: yogurt, cheese    GERD - Health Related Quality of Life Questionnaire (GERD- HRQL)  On  PPI    How bad is the heartburn? 2 = Symptoms noticeable and bothersome but not every day  Heartburn when lying down? 2 = Symptoms noticeable and bothersome but not every day  Heartburn when standing up? 2 = Symptoms noticeable and bothersome but not every day  Heartburn after meals? 2 = Symptoms noticeable and bothersome but not every day  Does heartburn change your diet? 2 = Symptoms noticeable and bothersome but not every day  Does heartburn wake you from sleep? 2 = Symptoms noticeable and bothersome but not every day    Do you have difficulty swallowing? 0 = No symptoms  Do you have pain with swallowing? 0 = No symptoms  If you take medication, does this affect your daily life? 0 = No symptoms    How bad is the regurgitation? 3 = Symptoms bothersome every day  Regurgitation when lying down? 3 = Symptoms bothersome every day  Regurgitation when standing up? 3 = Symptoms bothersome every day  Regurgitation after meals? 3 = Symptoms bothersome every day  Does regurgitation change your diet? 3 = Symptoms bothersome every day  Does regurgitation wake you from sleep? 3 = Symptoms bothersome every day    How satisfied are you with your present condition? Dissatisfied    Total score (calculated by summing the individual scores of questions 1-15): 30   Greatest possible score 75 (worst symptoms).   Lowest possible score 0 (no symptoms).    Heartburn score (calculated by summing the individual scores of questions 1-6): 12   Worst heartburn symptoms: 30.   No heartburn symptoms: 0.   Score less than or equal to 12 with each individual question not exceeding 2 indicate heartburn elimination.    Regurgitation score (calculated by summing the individual scores of questions 10-15): 18   Worst regurgitation symptoms: 30.   No regurgitation symptoms: 0.   Score less than or equal to 12 with each individual question not exceeding 2 indicate regurgitation elimination.    EXERCISE:  Yes     CURRENT MEDICATIONS:  Current Outpatient  Medications   Medication Sig Dispense Refill    allopurinol (Zyloprim) 100 mg tablet Take 4 tablets (400 mg) by mouth once daily. 360 tablet 3    cyclobenzaprine (Fexmid) 7.5 mg tablet Take 1 tablet (7.5 mg) by mouth 3 times a day as needed for muscle spasms. 60 tablet 2    famotidine (Pepcid) 40 mg tablet Take 1 tablet (40 mg) by mouth 2 times a day. 180 tablet 3    metaxalone (Skelaxin) 800 mg tablet Take 1 tablet (800 mg) by mouth 2 times a day. 30 tablet 3    naloxone (Narcan) 4 mg/0.1 mL nasal spray Administer 1 spray (4 mg) into affected nostril(s) if needed for opioid reversal. 2 each 0    omeprazole (PriLOSEC) 40 mg DR capsule Take 1 capsule (40 mg) by mouth 2 times a day before meals. 180 capsule 3    omeprazole (PriLOSEC) 40 mg DR capsule TAKE 1 CAPSULE BY MOUTH 2 TIMES A DAY.  OPEN CAPSULE AND SPRINKLE IN SUGAR FREE APPLESAUCE OR PUDDING 180 capsule 0    oxyCODONE-acetaminophen (Percocet) 5-325 mg tablet Take 1-2 tablets by mouth every 6 hours if needed for severe pain (7 - 10). 60 tablet 0     No current facility-administered medications for this visit.       PAST MEDICAL HISTORY:    Past Medical History:   Diagnosis Date    Abnormal weight loss 10/16/2018    Rapid weight loss    Acute bronchospasm 02/23/2018    Post-infection bronchospasm    Anesthesia of skin     Numbness, limb    Anxiety disorder, unspecified 08/30/2015    Anxiety and depression    Biomechanical lesion, unspecified 04/16/2015    Nonallopathic lesion of rib cage    Body mass index (BMI) 38.0-38.9, adult 07/05/2018    BMI 38.0-38.9,adult    Carpal tunnel syndrome, bilateral upper limbs 05/23/2017    Bilateral carpal tunnel syndrome    Chronic gout, unspecified, without tophus (tophi) 09/03/2019    Chronic gout    Cramp and spasm 09/08/2017    Muscle cramping    Dietary counseling and surveillance     Pre-bariatric surgery nutrition evaluation    Idiopathic gout, right hand 10/14/2019    Idiopathic gout of right hand, unspecified  chronicity    Morbid (severe) obesity due to excess calories (Multi) 05/31/2017    Obesity, morbid, BMI 40.0-49.9    Other acute postprocedural pain     Post-op pain    Other intervertebral disc degeneration, lumbar region 04/19/2016    Other intervertebral disc degeneration, lumbar region    Other specified anxiety disorders 05/31/2017    Other specified anxiety disorders    Other symptoms and signs involving the genitourinary system 11/04/2016    Bladder pain    Pain in leg, unspecified 09/15/2013    Lower extremity pain    Personal history of other benign neoplasm 06/27/2019    History of other benign neoplasm    Personal history of other diseases of the circulatory system 09/26/2013    History of hypertension    Personal history of other diseases of the digestive system 10/08/2015    History of esophageal reflux    Personal history of other diseases of the digestive system 08/01/2018    History of umbilical hernia    Personal history of other diseases of the musculoskeletal system and connective tissue 12/19/2016    History of joint pain    Personal history of other diseases of the musculoskeletal system and connective tissue 09/01/2017    History of back pain    Personal history of other diseases of the musculoskeletal system and connective tissue 09/08/2017    History of muscle spasm    Personal history of other diseases of the nervous system and sense organs     History of sleep apnea    Personal history of other diseases of the respiratory system 01/07/2020    History of sinusitis    Personal history of other diseases of urinary system 11/04/2016    History of hematuria    Personal history of other diseases of urinary system 08/02/2021    History of hematuria    Personal history of other endocrine, nutritional and metabolic disease 05/16/2017    History of hypokalemia    Personal history of other endocrine, nutritional and metabolic disease 04/16/2015    History of hypokalemia    Personal history of other  endocrine, nutritional and metabolic disease     History of dehydration    Personal history of other mental and behavioral disorders     History of depression    Personal history of other specified conditions 12/07/2015    History of paresthesia    Personal history of other specified conditions 10/29/2014    History of weight gain    Personal history of other specified conditions 10/29/2015    History of headache    Personal history of other specified conditions     History of heartburn    Personal history of other specified conditions 02/23/2018    History of abdominal pain    Personal history of other specified conditions 03/11/2017    History of polyuria    Personal history of other specified conditions 11/04/2016    History of gross hematuria    Personal history of other specified conditions 07/21/2021    History of dysuria    Personal history of other specified conditions 07/21/2021    History of left flank pain    Personal history of other specified conditions 10/24/2016    History of dysuria    Personal history of other specified conditions 11/12/2013    History of breast lump    Personal history of other specified conditions 02/23/2018    History of abdominal pain    Personal history of urinary (tract) infections 09/26/2013    History of cystitis    Snoring     Snoring    Sprain of ligaments of lumbar spine, initial encounter 12/19/2016    Low back sprain    Strain of muscle, fascia and tendon at neck level, initial encounter 12/07/2018    Cervical strain    Unspecified disturbances of skin sensation 10/14/2019    Paresthesias/numbness        PAST SURGICAL HISTORY:  Past Surgical History:   Procedure Laterality Date    APPENDECTOMY  09/17/2013    Laparoscopic Appendectomy    GASTRIC BYPASS  08/21/2018    Gastric Surgery For Morbid Obesity Bypass With Neymar-en-Y    KNEE CARTILAGE SURGERY Left     OTHER SURGICAL HISTORY  10/27/2014    Incisional Hernia Repair    SHOULDER SURGERY  10/27/2014    Shoulder Surgery     TONSILLECTOMY  10/27/2014    Tonsillectomy    UPPER GASTROINTESTINAL ENDOSCOPY N/A        FAMILY HISTORY:    No family history on file.     SOCIAL HISTORY:    Social History     Tobacco Use    Smoking status: Never    Smokeless tobacco: Never   Substance Use Topics    Alcohol use: Not Currently    Drug use: Never       ALLERGIES:    Allergies   Allergen Reactions    Ciprofloxacin Tinnitus    Levofloxacin Tinnitus    Nsaids (Non-Steroidal Anti-Inflammatory Drug) Other    Adhesive Tape-Silicones Rash     Clear/transparent tape only and some bandaides    Gloves, Latex With Aloe Vera Rash    Latex Rash       REVIEW OF SYSTEMS:  GENERAL: Negative for malaise, significant weight loss and fever  NECK: Negative for lumps, goiter, pain and significant neck swelling  RESPIRATORY: Negative for cough, wheezing or shortness of breath.  CARDIOVASCULAR: Negative for chest pain, leg swelling or palpitations.  GI: Negative for abdominal discomfort, blood in stools or black stools or change in bowel habits  : No history of dysuria, frequency or incontinence  MUSCULOSKELETAL: Negative for joint pain or swelling, back pain or muscle pain.  SKIN: Negative for lesions, rash, and itching.  PSYCH: Negative for sleep disturbance, mood disorder and recent psychosocial stressors.  ENDOCRINE: Negative for cold or heat intolerance, polyuria, polydipsia and goiter.    PHYSICAL EXAM  There were no vitals taken for this visit.  General appearance: obese  Skin: warm, no erythema or rashes  Lungs: clear to percussion and auscultation  Heart: regular rhythm and S1, S2 normal  Abdomen: ***  Extremities: Normal exam of the extremities. No swelling or pain.    IMPRESSION:  Lito Unger is a 48 y.o. adult with ***    PLAN:  ***    The risks of the procedure including bleeding, infection, injury to neighboring organ, prolonged hospitalization, need for further procedure and death have been explained to the patient and Lito Unger has  expressed understanding and acceptance of them. Consent has been signed.    *** minutes spent with patient on face-to-face interaction, history/documentation, education, and coordination of care.    Dr. Rosy Handley M.D., MPH  Director of Bariatric & Minimally Invasive Surgery  [place] [id number]  T:  [phone]  F:  [phone]     Shelby Freedman, RN Assistant Nurse Manager, Care Coordinator Patient Nursing Contact  T: [phone] F: [phone]

## 2024-08-12 NOTE — PROGRESS NOTES
BARIATRIC SURGICAL WEIGHT LOSS   POST DIAGNOSTIC TESTING FOLLOW UP VISIT    CLINIC DATE:  8/14/24    NAME: Lito Unger 48 y.o.  MRN: [id number]    Date of Surgery: 2018   Surgical Procedure: Laparoscopic lois en y gastric bypass 09966  Extra Procedures: None      Initial weight: 359 lb  Pre-surgical weight: 317 lb  Today's Visit: 224 lb    THIS VISIT WEIGHT / BMI:    Wt Readings from Last 1 Encounters:   08/27/24 103 kg (226 lb)      BMI Readings from Last 1 Encounters:   08/27/24 28.25 kg/m²     WEIGHT / BMI HX:    Wt Readings from Last 3 Encounters:   08/27/24 103 kg (226 lb)   08/21/24 101 kg (222 lb)   06/21/24 102 kg (224 lb 6.9 oz)      BMI Readings from Last 3 Encounters:   08/27/24 28.25 kg/m²   08/21/24 27.75 kg/m²   06/21/24 28.05 kg/m²        TOTAL WEIGHT LOSS:  maintianing the weight loss      PROVIDER LAST IMPRESSION VISIT NOTE 4/10/24: 46 yo male post rygbp was having nausea and burning after eating. he was explored and noted to have an internal hernia. Since repair the nausea is better. He only complains of ongoing burning. He is on omeprazole. Notes meat and crackers make it worse. Avoids fish due to gout. We discussed maybe stopping tumeric. He says stopped for a month with no relief. At this point there is no evidence of actual reflux. We discussed he may have functional heartburn. We discussed trying meditation or yoga as well. He notes he also had difficult to control heartburn prior to surgery and was difficult to treat even with high dose ppi's.      We discussed trying adriana and other natual antinausea meds, and eating 3-5 times/day. Also advised GI to check his rectum due to the findings on CT. He notes so hard to find things to eat. Advised to cook meats moist. limited on fish due to his gout. At this time I see no surgical issues.   I last saw him in 2022 and last 2021 was egd.  All normal at that time.  Most recently ongoing pain with eating. Egd done by gi shows ulceration and  stricture at GJ.  Denies nsaid's and smoking and vaping.   Will add carafate.  Med list reviewed,  He is not able to eat meat.  Pain with eating all of the time.  Will see how egd is before considering next steps.        A/P:   Take carafate 4 times/day  Make the pills into a slurry.  I will scope you in a month, with a possible dilation.        PRESENTING FOR Bariatric Surgical Weight Loss Post Diagnostic Testing Results Review FUV: is a 48 y.o. adult who is 6 yrs S/P:  rygb & has completed previously ordered EGD.   Still having heartburn and still has a hard time eating but the heartburn improved some. Still struggling a lot.  Still on omeprazole and carafate.  Last dilation in June.  Notes good for a couple of days after the dilation.  Then slowly got back to what it was.  Then the second time it did not improve much after a couple of days.  Over a couple of weeks back to the same thing.   Just noted slightly less nausea.  At this point expects nausea now a days.  Hard to make the carafate slurries.      Had his rectum checked and told all ok.   TEST RESULTS:    EGD 5/8/24:  Impression  The upper third of the esophagus, middle third of the esophagus and lower third of the esophagus appeared normal.  The gastric pouch appeared normal.  Dilated with balloon dilator to 15 mm end size. Dilation caused mucosal tears and improved passage of the scope; post-dilation mucosal tears were superficial  Signs of previous surgery in the gastrojejunal anastomosis  The jejunum appeared normal.  The gastrojejunal anastomosis appeared normal.  Mild ulcerated mucosa in the gastrojejunal anastomosis  RYGB anatomy  Findings  The upper third of the esophagus, middle third of the esophagus and lower third of the esophagus appeared normal.  Regular Z-line 44 cm from the incisors  The gastric pouch appeared normal. No bile. No gastrogastric fistula.  Dilated with balloon dilator from 12 mm starting size to 15 mm end size with step size  of 2 mm. Dilation caused mucosal tears and improved passage of the scope; post-dilation mucosal tears were superficial  Signs of previous surgery in the gastrojejunal anastomosis  The jejunum appeared normal. No bile  The gastrojejunal anastomosis appeared normal. Z-line is 50 cm from the incisors.  Mild, localized ulcerated mucosa in the gastrojejunal anastomosis;     No bile, no gastrogastric fistula.   Pouch size 44 to 50 (z line to GJ)  Balloon dilation done from 12 to 15mm end size     Recommendation    Follow up with me in clinic     Repeat EGD in 1 month     Continue PPI and curafate  Follow up within a month for EGD and possible repeat dilation   Try zofran half hour before meals to help with nausea         EGD 6/21/24:  Impression  The esophagus appeared normal.  The gastric pouch appeared normal.  Mild ulcerated mucosa in the gastrojejunal anastomosis; performed cold forceps biopsy  The jejunum appeared normal.  Stricture in the gastrojejunal anastomosis; dilated to 15 mm end size  Findings  The esophagus appeared normal.  The gastric pouch appeared normal.  Z-line  Mild, localized ulcerated mucosa in the gastrojejunal anastomosis; performed cold forceps biopsy;  The jejunum appeared normal.  Benign-appearing stricture (traversable) in the gastrojejunal anastomosis; dilated with 8 mm long [email or web address] scientific balloon dilator using fluoroscopic guidance from 12 mm starting size to 15 mm end size  Recommendation    Await pathology results     Follow up with me in clinic     Keep on taking omeprazole  Take carafate 4 times/day-make into a slurry  See me in a month  Follow the pouch rules:       FINAL DIAGNOSIS   STOMACH, BIOPSY:  - FOVEOLAR EPITHELIUM AND FIBRINOPURULENT EXUDATE, CONSISTENT WITH DERIVATION FROM ULCER; NO HELICOBACTER OR MALIGNANCY IDENTIFIED.       CURRENT SYMPTOMS:      Abdominal pain:  Yes            Constipation: Yes- sometimes    Diarrhea: No    Dumping Syndrome:  Yes    Experiencing hunger:  No    Food Intolerances: Yes- meat, chicken, greasy food    Nausea/vomiting: Yes, always saliva, no food    Reflux:  Yes    Regurgitation:  Yes- saliva    Weight Regain: No       DIET HISTORY:       Carbonated Beverages: No          Caffeinated Beverages: Yes    Time to eat meals: several hours  Minutes    Fluid intake: 60 oz/day      Protein Intake:  20 grams/day    Breakfast: egg sandwich    Lunch: cheese    Dinner: green beans, sometimes chicken    Snacks: yogurt, cheese    GERD - Health Related Quality of Life Questionnaire (GERD- HRQL)  On PPI    How bad is the heartburn? 2 = Symptoms noticeable and bothersome but not every day  Heartburn when lying down? 2 = Symptoms noticeable and bothersome but not every day  Heartburn when standing up? 2 = Symptoms noticeable and bothersome but not every day  Heartburn after meals? 2 = Symptoms noticeable and bothersome but not every day  Does heartburn change your diet? 2 = Symptoms noticeable and bothersome but not every day  Does heartburn wake you from sleep? 2 = Symptoms noticeable and bothersome but not every day    Do you have difficulty swallowing? 0 = No symptoms  Do you have pain with swallowing? 0 = No symptoms  If you take medication, does this affect your daily life? 0 = No symptoms    How bad is the regurgitation? 3 = Symptoms bothersome every day  Regurgitation when lying down? 3 = Symptoms bothersome every day  Regurgitation when standing up? 3 = Symptoms bothersome every day  Regurgitation after meals? 3 = Symptoms bothersome every day  Does regurgitation change your diet? 3 = Symptoms bothersome every day  Does regurgitation wake you from sleep? 3 = Symptoms bothersome every day    How satisfied are you with your present condition? Dissatisfied    Total score (calculated by summing the individual scores of questions 1-15): 30   Greatest possible score 75 (worst symptoms).   Lowest possible score 0 (no symptoms).    Heartburn score (calculated by summing  the individual scores of questions 1-6): 12   Worst heartburn symptoms: 30.   No heartburn symptoms: 0.   Score less than or equal to 12 with each individual question not exceeding 2 indicate heartburn elimination.    Regurgitation score (calculated by summing the individual scores of questions 10-15): 18   Worst regurgitation symptoms: 30.   No regurgitation symptoms: 0.   Score less than or equal to 12 with each individual question not exceeding 2 indicate regurgitation elimination.    EXERCISE:  Yes     CURRENT MEDICATIONS:  Current Outpatient Medications   Medication Sig Dispense Refill    allopurinol (Zyloprim) 100 mg tablet Take 4 tablets (400 mg) by mouth once daily. 360 tablet 3    cyclobenzaprine (Flexeril) 10 mg tablet Take 1 tablet (10 mg) by mouth 3 times a day as needed for muscle spasms. 90 tablet 1    famotidine (Pepcid) 40 mg tablet Take 1 tablet (40 mg) by mouth 2 times a day. 180 tablet 3    fluticasone (Flonase) 50 mcg/actuation nasal spray Administer 2 sprays into each nostril once daily. Shake gently. Before first use, prime pump. After use, clean tip and replace cap. 16 g 5    imiquimod (Aldara) 5 % cream Apply 1 packet topically 3 times a week. 12 packet 1    metaxalone (Skelaxin) 800 mg tablet Take 1 tablet (800 mg) by mouth 2 times a day. 30 tablet 3    methylPREDNISolone (Medrol Dospak) 4 mg tablets Take as directed on package. 21 tablet 0    naloxone (Narcan) 4 mg/0.1 mL nasal spray Administer 1 spray (4 mg) into affected nostril(s) if needed for opioid reversal. 2 each 0    omeprazole (PriLOSEC) 40 mg DR capsule Take 1 capsule (40 mg) by mouth 2 times a day before meals. 180 capsule 3    oxyCODONE-acetaminophen (Percocet) 5-325 mg tablet Take 1-2 tablets by mouth every 6 hours if needed for severe pain (7 - 10). 60 tablet 0     No current facility-administered medications for this visit.       PAST MEDICAL HISTORY:    Past Medical History:   Diagnosis Date    Abnormal weight loss  10/16/2018    Rapid weight loss    Acute bronchospasm 02/23/2018    Post-infection bronchospasm    Anesthesia of skin     Numbness, limb    Anxiety disorder, unspecified 08/30/2015    Anxiety and depression    Biomechanical lesion, unspecified 04/16/2015    Nonallopathic lesion of rib cage    Body mass index (BMI) 38.0-38.9, adult 07/05/2018    BMI 38.0-38.9,adult    Carpal tunnel syndrome, bilateral upper limbs 05/23/2017    Bilateral carpal tunnel syndrome    Chronic gout, unspecified, without tophus (tophi) 09/03/2019    Chronic gout    Cramp and spasm 09/08/2017    Muscle cramping    Dietary counseling and surveillance     Pre-bariatric surgery nutrition evaluation    Idiopathic gout, right hand 10/14/2019    Idiopathic gout of right hand, unspecified chronicity    Morbid (severe) obesity due to excess calories (Multi) 05/31/2017    Obesity, morbid, BMI 40.0-49.9    Other acute postprocedural pain     Post-op pain    Other intervertebral disc degeneration, lumbar region 04/19/2016    Other intervertebral disc degeneration, lumbar region    Other specified anxiety disorders 05/31/2017    Other specified anxiety disorders    Other symptoms and signs involving the genitourinary system 11/04/2016    Bladder pain    Pain in leg, unspecified 09/15/2013    Lower extremity pain    Personal history of other benign neoplasm 06/27/2019    History of other benign neoplasm    Personal history of other diseases of the circulatory system 09/26/2013    History of hypertension    Personal history of other diseases of the digestive system 10/08/2015    History of esophageal reflux    Personal history of other diseases of the digestive system 08/01/2018    History of umbilical hernia    Personal history of other diseases of the musculoskeletal system and connective tissue 12/19/2016    History of joint pain    Personal history of other diseases of the musculoskeletal system and connective tissue 09/01/2017    History of back pain     Personal history of other diseases of the musculoskeletal system and connective tissue 09/08/2017    History of muscle spasm    Personal history of other diseases of the nervous system and sense organs     History of sleep apnea    Personal history of other diseases of the respiratory system 01/07/2020    History of sinusitis    Personal history of other diseases of urinary system 11/04/2016    History of hematuria    Personal history of other diseases of urinary system 08/02/2021    History of hematuria    Personal history of other endocrine, nutritional and metabolic disease 05/16/2017    History of hypokalemia    Personal history of other endocrine, nutritional and metabolic disease 04/16/2015    History of hypokalemia    Personal history of other endocrine, nutritional and metabolic disease     History of dehydration    Personal history of other mental and behavioral disorders     History of depression    Personal history of other specified conditions 12/07/2015    History of paresthesia    Personal history of other specified conditions 10/29/2014    History of weight gain    Personal history of other specified conditions 10/29/2015    History of headache    Personal history of other specified conditions     History of heartburn    Personal history of other specified conditions 02/23/2018    History of abdominal pain    Personal history of other specified conditions 03/11/2017    History of polyuria    Personal history of other specified conditions 11/04/2016    History of gross hematuria    Personal history of other specified conditions 07/21/2021    History of dysuria    Personal history of other specified conditions 07/21/2021    History of left flank pain    Personal history of other specified conditions 10/24/2016    History of dysuria    Personal history of other specified conditions 11/12/2013    History of breast lump    Personal history of other specified conditions 02/23/2018    History of abdominal  pain    Personal history of urinary (tract) infections 09/26/2013    History of cystitis    Snoring     Snoring    Sprain of ligaments of lumbar spine, initial encounter 12/19/2016    Low back sprain    Strain of muscle, fascia and tendon at neck level, initial encounter 12/07/2018    Cervical strain    Unspecified disturbances of skin sensation 10/14/2019    Paresthesias/numbness        PAST SURGICAL HISTORY:  Past Surgical History:   Procedure Laterality Date    APPENDECTOMY  09/17/2013    Laparoscopic Appendectomy    GASTRIC BYPASS  08/21/2018    Gastric Surgery For Morbid Obesity Bypass With Neymar-en-Y    KNEE CARTILAGE SURGERY Left     OTHER SURGICAL HISTORY  10/27/2014    Incisional Hernia Repair    SHOULDER SURGERY  10/27/2014    Shoulder Surgery    TONSILLECTOMY  10/27/2014    Tonsillectomy    UPPER GASTROINTESTINAL ENDOSCOPY N/A        FAMILY HISTORY:    No family history on file.     SOCIAL HISTORY:    Social History     Tobacco Use    Smoking status: Never    Smokeless tobacco: Never   Substance Use Topics    Alcohol use: Not Currently    Drug use: Never       ALLERGIES:    Allergies   Allergen Reactions    Ciprofloxacin Tinnitus    Levofloxacin Tinnitus    Nsaids (Non-Steroidal Anti-Inflammatory Drug) Other    Adhesive Tape-Silicones Rash     Clear/transparent tape only and some bandaides    Gloves, Latex With Aloe Vera Rash    Latex Rash       REVIEW OF SYSTEMS:  GENERAL: Negative for malaise, significant weight loss and fever  NECK: Negative for lumps, goiter, pain and significant neck swelling  RESPIRATORY: Negative for cough, wheezing or shortness of breath.  CARDIOVASCULAR: Negative for chest pain, leg swelling or palpitations.  GI: Negative for abdominal discomfort, blood in stools or black stools or change in bowel habits  : No history of dysuria, frequency or incontinence  MUSCULOSKELETAL: Negative for joint pain or swelling, back pain or muscle pain.  SKIN: Negative for lesions, rash, and  itching.  PSYCH: Negative for sleep disturbance, mood disorder and recent psychosocial stressors.  ENDOCRINE: Negative for cold or heat intolerance, polyuria, polydipsia and goiter.    PHYSICAL EXAM  Visit Vitals  /89   Pulse 83     General appearance: obese  Skin: warm, no erythema or rashes  Lungs: clear to percussion and auscultation  Heart: regular rhythm and S1, S2 normal  Abdomen: soft, germaine tenderness.   Extremities: Normal exam of the extremities. No swelling or pain.    IMPRESSION:  Lito Unger is a 48 y.o. adult with 6 years post bypass overall continues to struggle with pain with eating.  We have dilated twice with last in June to 15 mm.   Notes a lot of reflux prior to surgery and 2 nexium bid hardly controlled his symptoms.  His acid went away after the bypass and now feels like taking a step back. Not having same symtopms with no pulmonary issues. We can make the antiacid medicine twice/day.  He notes his swallowing is better but conntiues to have issues.  Will plan for sequential dilations with steroi injections in hopes that the dilations will be more robust.  Patient is agreeable to the plan.  No ulcer causing activities notede from the patient side.   PLAN:  Continue the omeprazole in am and the pepcid at dinner time  Take the carafate 4 times/day.   We will do sequential stretching with steroid for 3 months in a row.      Dr. Rosy Handley M.D., MPH  Director of Bariatric & Minimally Invasive Surgery  [address] [address]  T:  [phone]  F:  [phone]     Shelby Freedman, RN Assistant Nurse Manager, Care Coordinator Patient Nursing Contact  T: [phone] F: [phone]

## 2024-08-14 ENCOUNTER — APPOINTMENT (OUTPATIENT)
Dept: SURGERY | Facility: CLINIC | Age: 49
End: 2024-08-14
Payer: COMMERCIAL

## 2024-08-21 ENCOUNTER — OFFICE VISIT (OUTPATIENT)
Dept: SURGERY | Facility: CLINIC | Age: 49
End: 2024-08-21
Payer: COMMERCIAL

## 2024-08-21 VITALS
DIASTOLIC BLOOD PRESSURE: 89 MMHG | HEART RATE: 83 BPM | SYSTOLIC BLOOD PRESSURE: 141 MMHG | BODY MASS INDEX: 27.75 KG/M2 | WEIGHT: 222 LBS

## 2024-08-21 DIAGNOSIS — Z98.84 STATUS POST BARIATRIC SURGERY: Primary | ICD-10-CM

## 2024-08-21 DIAGNOSIS — R10.13 EPIGASTRIC PAIN: ICD-10-CM

## 2024-08-21 DIAGNOSIS — K91.89 ANASTOMOTIC STRICTURE OF GASTROJEJUNOSTOMY: ICD-10-CM

## 2024-08-21 PROCEDURE — 99214 OFFICE O/P EST MOD 30 MIN: CPT | Performed by: SURGERY

## 2024-08-21 PROCEDURE — 3079F DIAST BP 80-89 MM HG: CPT | Performed by: SURGERY

## 2024-08-21 PROCEDURE — 3077F SYST BP >= 140 MM HG: CPT | Performed by: SURGERY

## 2024-08-22 DIAGNOSIS — R10.13 DYSPEPSIA: ICD-10-CM

## 2024-08-22 DIAGNOSIS — K21.9 GASTROESOPHAGEAL REFLUX DISEASE, UNSPECIFIED WHETHER ESOPHAGITIS PRESENT: ICD-10-CM

## 2024-08-22 DIAGNOSIS — K28.9 ULCER JEJUNUM: ICD-10-CM

## 2024-08-22 DIAGNOSIS — Z98.84 STATUS POST BARIATRIC SURGERY: ICD-10-CM

## 2024-08-22 DIAGNOSIS — R10.13 EPIGASTRIC PAIN: ICD-10-CM

## 2024-08-22 DIAGNOSIS — E55.9 VITAMIN D DEFICIENCY: ICD-10-CM

## 2024-08-22 DIAGNOSIS — E87.6 HYPOKALEMIA: ICD-10-CM

## 2024-08-27 ENCOUNTER — APPOINTMENT (OUTPATIENT)
Dept: PRIMARY CARE | Facility: CLINIC | Age: 49
End: 2024-08-27
Payer: COMMERCIAL

## 2024-08-27 ENCOUNTER — LAB (OUTPATIENT)
Dept: LAB | Facility: LAB | Age: 49
End: 2024-08-27
Payer: COMMERCIAL

## 2024-08-27 VITALS
HEART RATE: 78 BPM | HEIGHT: 75 IN | OXYGEN SATURATION: 97 % | WEIGHT: 226 LBS | SYSTOLIC BLOOD PRESSURE: 130 MMHG | DIASTOLIC BLOOD PRESSURE: 82 MMHG | BODY MASS INDEX: 28.1 KG/M2

## 2024-08-27 DIAGNOSIS — R19.7 DIARRHEA, UNSPECIFIED TYPE: ICD-10-CM

## 2024-08-27 DIAGNOSIS — M51.36 DISCOGENIC LOW BACK PAIN: ICD-10-CM

## 2024-08-27 DIAGNOSIS — M25.50 ARTHRALGIA, UNSPECIFIED JOINT: ICD-10-CM

## 2024-08-27 DIAGNOSIS — M10.9 GOUT, UNSPECIFIED CAUSE, UNSPECIFIED CHRONICITY, UNSPECIFIED SITE: ICD-10-CM

## 2024-08-27 DIAGNOSIS — G89.29 OTHER CHRONIC PAIN: ICD-10-CM

## 2024-08-27 DIAGNOSIS — B07.8 OTHER VIRAL WARTS: ICD-10-CM

## 2024-08-27 DIAGNOSIS — Z98.84 STATUS POST BARIATRIC SURGERY: ICD-10-CM

## 2024-08-27 DIAGNOSIS — H69.93 DYSFUNCTION OF BOTH EUSTACHIAN TUBES: ICD-10-CM

## 2024-08-27 DIAGNOSIS — Z00.00 ANNUAL PHYSICAL EXAM: Primary | ICD-10-CM

## 2024-08-27 DIAGNOSIS — M47.816 FACET DEGENERATION OF LUMBAR REGION: ICD-10-CM

## 2024-08-27 DIAGNOSIS — Z00.00 ANNUAL PHYSICAL EXAM: ICD-10-CM

## 2024-08-27 DIAGNOSIS — K21.00 GASTROESOPHAGEAL REFLUX DISEASE WITH ESOPHAGITIS WITHOUT HEMORRHAGE: ICD-10-CM

## 2024-08-27 DIAGNOSIS — Z13.21 ENCOUNTER FOR VITAMIN DEFICIENCY SCREENING: ICD-10-CM

## 2024-08-27 DIAGNOSIS — M51.36 LUMBAR DEGENERATIVE DISC DISEASE: ICD-10-CM

## 2024-08-27 DIAGNOSIS — I10 PRIMARY HYPERTENSION: ICD-10-CM

## 2024-08-27 DIAGNOSIS — Z98.84 BARIATRIC SURGERY STATUS: ICD-10-CM

## 2024-08-27 LAB
25(OH)D3 SERPL-MCNC: 42 NG/ML (ref 30–100)
BASOPHILS # BLD AUTO: 0.05 X10*3/UL (ref 0–0.1)
BASOPHILS NFR BLD AUTO: 1 %
CHOLEST SERPL-MCNC: 152 MG/DL (ref 0–199)
CHOLESTEROL/HDL RATIO: 3.5
CRP SERPL-MCNC: 0.11 MG/DL
EOSINOPHIL # BLD AUTO: 0.17 X10*3/UL (ref 0–0.7)
EOSINOPHIL NFR BLD AUTO: 3.3 %
ERYTHROCYTE [DISTWIDTH] IN BLOOD BY AUTOMATED COUNT: 12.8 % (ref 11.5–14.5)
ERYTHROCYTE [SEDIMENTATION RATE] IN BLOOD BY WESTERGREN METHOD: 3 MM/H (ref 0–20)
FERRITIN SERPL-MCNC: 16 NG/ML (ref 8–300)
FOLATE SERPL-MCNC: 9.7 NG/ML
GLIADIN PEPTIDE IGA SER IA-ACNC: <1 U/ML
HCT VFR BLD AUTO: 42.4 % (ref 36–52)
HDLC SERPL-MCNC: 43.6 MG/DL
HGB BLD-MCNC: 14 G/DL (ref 12–17.5)
IMM GRANULOCYTES # BLD AUTO: 0.01 X10*3/UL (ref 0–0.7)
IMM GRANULOCYTES NFR BLD AUTO: 0.2 % (ref 0–0.9)
IRON SATN MFR SERPL: 23 % (ref 25–45)
IRON SERPL-MCNC: 123 UG/DL (ref 35–150)
LDLC SERPL CALC-MCNC: 94 MG/DL
LYMPHOCYTES # BLD AUTO: 1.46 X10*3/UL (ref 1.2–4.8)
LYMPHOCYTES NFR BLD AUTO: 28 %
MCH RBC QN AUTO: 28.6 PG (ref 26–34)
MCHC RBC AUTO-ENTMCNC: 33 G/DL (ref 32–36)
MCV RBC AUTO: 87 FL (ref 80–100)
MONOCYTES # BLD AUTO: 0.54 X10*3/UL (ref 0.1–1)
MONOCYTES NFR BLD AUTO: 10.3 %
NEUTROPHILS # BLD AUTO: 2.99 X10*3/UL (ref 1.2–7.7)
NEUTROPHILS NFR BLD AUTO: 57.2 %
NON HDL CHOLESTEROL: 108 MG/DL (ref 0–149)
NRBC BLD-RTO: 0 /100 WBCS (ref 0–0)
PLATELET # BLD AUTO: 226 X10*3/UL (ref 150–450)
PTH-INTACT SERPL-MCNC: 74.6 PG/ML (ref 18.5–88)
RBC # BLD AUTO: 4.89 X10*6/UL (ref 4–5.9)
RHEUMATOID FACT SER NEPH-ACNC: <10 IU/ML (ref 0–15)
TIBC SERPL-MCNC: 525 UG/DL (ref 240–445)
TRIGL SERPL-MCNC: 71 MG/DL (ref 0–149)
TSH SERPL-ACNC: 0.59 MIU/L (ref 0.44–3.98)
TTG IGA SER IA-ACNC: <1 U/ML
UIBC SERPL-MCNC: 402 UG/DL (ref 110–370)
URATE SERPL-MCNC: 5 MG/DL (ref 2.3–7.5)
VIT B12 SERPL-MCNC: 488 PG/ML (ref 211–911)
VLDL: 14 MG/DL (ref 0–40)
WBC # BLD AUTO: 5.2 X10*3/UL (ref 4.4–11.3)

## 2024-08-27 PROCEDURE — 86038 ANTINUCLEAR ANTIBODIES: CPT

## 2024-08-27 PROCEDURE — 84443 ASSAY THYROID STIM HORMONE: CPT

## 2024-08-27 PROCEDURE — 84425 ASSAY OF VITAMIN B-1: CPT

## 2024-08-27 PROCEDURE — 36415 COLL VENOUS BLD VENIPUNCTURE: CPT

## 2024-08-27 PROCEDURE — 83516 IMMUNOASSAY NONANTIBODY: CPT

## 2024-08-27 PROCEDURE — 82525 ASSAY OF COPPER: CPT

## 2024-08-27 PROCEDURE — 85652 RBC SED RATE AUTOMATED: CPT

## 2024-08-27 PROCEDURE — 99396 PREV VISIT EST AGE 40-64: CPT | Performed by: FAMILY MEDICINE

## 2024-08-27 PROCEDURE — 82728 ASSAY OF FERRITIN: CPT

## 2024-08-27 PROCEDURE — 84630 ASSAY OF ZINC: CPT

## 2024-08-27 PROCEDURE — 83550 IRON BINDING TEST: CPT

## 2024-08-27 PROCEDURE — 85025 COMPLETE CBC W/AUTO DIFF WBC: CPT

## 2024-08-27 PROCEDURE — 3008F BODY MASS INDEX DOCD: CPT | Performed by: FAMILY MEDICINE

## 2024-08-27 PROCEDURE — 86140 C-REACTIVE PROTEIN: CPT

## 2024-08-27 PROCEDURE — 82306 VITAMIN D 25 HYDROXY: CPT

## 2024-08-27 PROCEDURE — 99214 OFFICE O/P EST MOD 30 MIN: CPT | Performed by: FAMILY MEDICINE

## 2024-08-27 PROCEDURE — 80061 LIPID PANEL: CPT

## 2024-08-27 PROCEDURE — 83540 ASSAY OF IRON: CPT

## 2024-08-27 PROCEDURE — 3079F DIAST BP 80-89 MM HG: CPT | Performed by: FAMILY MEDICINE

## 2024-08-27 PROCEDURE — 84590 ASSAY OF VITAMIN A: CPT

## 2024-08-27 PROCEDURE — 86431 RHEUMATOID FACTOR QUANT: CPT

## 2024-08-27 PROCEDURE — 3075F SYST BP GE 130 - 139MM HG: CPT | Performed by: FAMILY MEDICINE

## 2024-08-27 PROCEDURE — 82607 VITAMIN B-12: CPT

## 2024-08-27 PROCEDURE — 84550 ASSAY OF BLOOD/URIC ACID: CPT

## 2024-08-27 PROCEDURE — 82746 ASSAY OF FOLIC ACID SERUM: CPT

## 2024-08-27 PROCEDURE — 83970 ASSAY OF PARATHORMONE: CPT

## 2024-08-27 RX ORDER — FLUTICASONE PROPIONATE 50 MCG
2 SPRAY, SUSPENSION (ML) NASAL DAILY
Qty: 16 G | Refills: 5 | Status: SHIPPED | OUTPATIENT
Start: 2024-08-27 | End: 2025-08-27

## 2024-08-27 RX ORDER — OMEPRAZOLE 40 MG/1
40 CAPSULE, DELAYED RELEASE ORAL
Qty: 180 CAPSULE | Refills: 3 | Status: SHIPPED | OUTPATIENT
Start: 2024-08-27

## 2024-08-27 RX ORDER — OXYCODONE AND ACETAMINOPHEN 5; 325 MG/1; MG/1
1-2 TABLET ORAL EVERY 6 HOURS PRN
Qty: 60 TABLET | Refills: 0 | Status: SHIPPED | OUTPATIENT
Start: 2024-08-27 | End: 2024-09-26

## 2024-08-27 RX ORDER — CYCLOBENZAPRINE HCL 10 MG
10 TABLET ORAL 3 TIMES DAILY PRN
Qty: 90 TABLET | Refills: 1 | Status: SHIPPED | OUTPATIENT
Start: 2024-08-27

## 2024-08-27 RX ORDER — ALLOPURINOL 100 MG/1
400 TABLET ORAL DAILY
Qty: 360 TABLET | Refills: 3 | Status: SHIPPED | OUTPATIENT
Start: 2024-08-27

## 2024-08-27 RX ORDER — METHYLPREDNISOLONE 4 MG/1
TABLET ORAL
Qty: 21 TABLET | Refills: 0 | Status: SHIPPED | OUTPATIENT
Start: 2024-08-27 | End: 2024-09-03

## 2024-08-27 RX ORDER — IMIQUIMOD 12.5 MG/.25G
1 CREAM TOPICAL 3 TIMES WEEKLY
Qty: 12 PACKET | Refills: 1 | Status: SHIPPED | OUTPATIENT
Start: 2024-08-28

## 2024-08-27 ASSESSMENT — PATIENT HEALTH QUESTIONNAIRE - PHQ9
4. FEELING TIRED OR HAVING LITTLE ENERGY: SEVERAL DAYS
7. TROUBLE CONCENTRATING ON THINGS, SUCH AS READING THE NEWSPAPER OR WATCHING TELEVISION: NOT AT ALL
5. POOR APPETITE OR OVEREATING: NOT AT ALL
SUM OF ALL RESPONSES TO PHQ QUESTIONS 1-9: 4
6. FEELING BAD ABOUT YOURSELF - OR THAT YOU ARE A FAILURE OR HAVE LET YOURSELF OR YOUR FAMILY DOWN: SEVERAL DAYS
9. THOUGHTS THAT YOU WOULD BE BETTER OFF DEAD, OR OF HURTING YOURSELF: NOT AT ALL
10. IF YOU CHECKED OFF ANY PROBLEMS, HOW DIFFICULT HAVE THESE PROBLEMS MADE IT FOR YOU TO DO YOUR WORK, TAKE CARE OF THINGS AT HOME, OR GET ALONG WITH OTHER PEOPLE: NOT DIFFICULT AT ALL
1. LITTLE INTEREST OR PLEASURE IN DOING THINGS: NOT AT ALL
SUM OF ALL RESPONSES TO PHQ9 QUESTIONS 1 AND 2: 1
3. TROUBLE FALLING OR STAYING ASLEEP OR SLEEPING TOO MUCH: SEVERAL DAYS
2. FEELING DOWN, DEPRESSED OR HOPELESS: SEVERAL DAYS
8. MOVING OR SPEAKING SO SLOWLY THAT OTHER PEOPLE COULD HAVE NOTICED. OR THE OPPOSITE, BEING SO FIGETY OR RESTLESS THAT YOU HAVE BEEN MOVING AROUND A LOT MORE THAN USUAL: NOT AT ALL

## 2024-08-27 ASSESSMENT — ANXIETY QUESTIONNAIRES
1. FEELING NERVOUS, ANXIOUS, OR ON EDGE: SEVERAL DAYS
GAD7 TOTAL SCORE: 4
IF YOU CHECKED OFF ANY PROBLEMS ON THIS QUESTIONNAIRE, HOW DIFFICULT HAVE THESE PROBLEMS MADE IT FOR YOU TO DO YOUR WORK, TAKE CARE OF THINGS AT HOME, OR GET ALONG WITH OTHER PEOPLE: NOT DIFFICULT AT ALL
2. NOT BEING ABLE TO STOP OR CONTROL WORRYING: SEVERAL DAYS
6. BECOMING EASILY ANNOYED OR IRRITABLE: NOT AT ALL
7. FEELING AFRAID AS IF SOMETHING AWFUL MIGHT HAPPEN: SEVERAL DAYS
4. TROUBLE RELAXING: NOT AT ALL
5. BEING SO RESTLESS THAT IT IS HARD TO SIT STILL: NOT AT ALL
3. WORRYING TOO MUCH ABOUT DIFFERENT THINGS: SEVERAL DAYS

## 2024-08-27 ASSESSMENT — LIFESTYLE VARIABLES
AUDIT-C TOTAL SCORE: 0
TOTAL SCORE: 0
SKIP TO QUESTIONS 9-10: 1
HOW OFTEN DO YOU HAVE SIX OR MORE DRINKS ON ONE OCCASION: NEVER
HOW MANY STANDARD DRINKS CONTAINING ALCOHOL DO YOU HAVE ON A TYPICAL DAY: PATIENT DOES NOT DRINK
HOW OFTEN DO YOU HAVE A DRINK CONTAINING ALCOHOL: NEVER

## 2024-08-27 NOTE — PROGRESS NOTES
Subjective   Patient ID: Lito Unger is a 48 y.o. adult who presents for Annual Exam and Follow-up.    HPI    RIGHT EAR: bothering him for the last 2 weeks  No fever or chills  No Cough  Daughter with recent ear infection as well    SKIN LESIONS: noticing raised small bumps along inner right elbow  Seems to have more small lesions breaking out in same area - new lesions along left forearm as well    HTN: BP improved today  Denies CP, SOB, Edema, palpitations or headache.     GERD: 9/11/2024 Dr. Handley performing repeat dilation of esophagus  Recurring ulcerations noted last EGD  Currently taking Omeprazole regularly and Famotidine twice daily  Sulcrafate in the past  Still needing Tums intermittently    Swallowing study performed already    S/P BARIATRIC SURGERY:  Difficulty maintaining weight  Increaed nausea with eating - feels like foods getting stuck and then cannot continue to eat    CHRONIC LOW BACK PAIN:  In review:  Has had chronic low back pain for many years  Had been to orthopedics and multiple rounds of PT as well as chiropractic care     MRI 2015 - herniated disc at L5-S1  Has had multiple courses of formal PT  Did see Dr. Radha Young for pain management options      Did have injections with Dr. Lynne but did not feel like it was helping  he suggested ablation of nerve but Ray does not feel ready for this next step      Failed Gabapentin and Lyrica - swelling  Did not tolerate Topamax due to severe mood swings     OARRS:  Jeannie Sanders DO on 8/27/2024  9:04 AM  I have personally reviewed the OARRS report for Lito Unger. I have considered the risks of abuse, dependence, addiction and diversion    Is the patient prescribed a combination of a benzodiazepine and opioid?  No    Last Urine Drug Screen:  Recent Results (from the past 8760 hour(s))   Opiate Confirmation, Urine    Collection Time: 01/26/24 10:48 AM   Result Value Ref Range    6-Acetylmorphine <25 <25 ng/mL    Codeine <50 <50  ng/mL    Hydrocodone <25 <25 ng/mL    Hydromorphone <25 <25 ng/mL    Morphine  <50 <50 ng/mL    Norhydrocodone <25 <25 ng/mL    Noroxycodone <25 <25 ng/mL    Oxycodone <25 <25 ng/mL    Oxymorphone <25 <25 ng/mL   Drug Screen, Urine With Reflex to Confirmation    Collection Time: 01/26/24 10:48 AM   Result Value Ref Range    Amphetamine Screen, Urine Presumptive Negative Presumptive Negative    Barbiturate Screen, Urine Presumptive Negative Presumptive Negative    Benzodiazepines Screen, Urine Presumptive Negative Presumptive Negative    Cannabinoid Screen, Urine Presumptive Negative Presumptive Negative    Cocaine Metabolite Screen, Urine Presumptive Negative Presumptive Negative    Fentanyl Screen, Urine Presumptive Negative Presumptive Negative    Opiate Screen, Urine Presumptive Negative Presumptive Negative    Oxycodone Screen, Urine Presumptive Negative Presumptive Negative    PCP Screen, Urine Presumptive Negative Presumptive Negative     Results are as expected.   Clinical rationale for not completing a Urine Drug Screen:     Controlled Substance Agreement:  Date of the Last Agreement: 1/26/2024  Reviewed Controlled Substance Agreement including but not limited to the benefits, risks, and alternatives to treatment with a Controlled Substance medication(s).  Reviewed Controlled Substance Agreement including but not limited to the benefits, risk, and alternatives to treatment with a Controlled Substance medication(s)     Opioids:  What is the patient's goal of therapy? Maintain tolerable pain levels to carry out daily activities  Is this being achieved with current treatment? yes    I have calculated the patient's Morphine Dose Equivalent (MED):   I have considered referral to Pain Management and/or a specialist, and do not feel it is necessary at this time.    I feel that it is clinically indicated to continue this current medication regimen after consideration of alternative therapies, and other non-opioid  treatment.    Opioid Risk Screening:  Family History of Substance Abuse  Alcohol: 0 (8/27/2024  9:00 AM)  Illegal Drugs: 0 (8/27/2024  9:00 AM)  Prescription Drugs: 0 (8/27/2024  9:00 AM)    Personal History of Substance Abuse  Alcohol: 0 (8/27/2024  9:00 AM)  Illegal Drugs: 0 (8/27/2024  9:00 AM)  Prescription Drugs: 0 (8/27/2024  9:00 AM)    Patient Age (16-45)  Age (16-45): 0 (8/27/2024  9:00 AM)    History of Preadolescent Sexual Abuse  History of Preadolescent Sexual Abuse: .0 (8/27/2024  9:00 AM)    Psychological Disease  Attention Deficit Disorder, Obsessive Compulsive Disorder, Bipolar, Schizophrenia: 0 (8/27/2024  9:00 AM)  Depression: 0 (8/27/2024  9:00 AM)    Total Score  Total Score: 0 (8/27/2024  9:00 AM)    Total Score Risk Category  TOTAL SCORE CATEGORY: Low Risk (0-3) (8/27/2024  9:00 AM)        Pain Assessment:  Analgesia  What was your pain level on average during the past week?: 7  What was your pain level at its worst during the past week?: 9  What percentage of your pain has been relieved during the past week?: 50 %  Is the amount of pain relief you are now obtaining from your current pain relievers enough to make a real difference in your life?: Y  Query to Clinician: Is the patient's pain relief clinically significant?: Yes    Activities of Daily Living  Physical Functioning: Same  Family Relationships: Same  Social Relationships: Same  Mood: Same  Sleep Patterns: Same  Overall Functioning: Same    Adverse Events  Is patient experiencing any side effects from current pain relievers?: N      Assessment  Is your overall impression that this patient is benefiting from opioid therapy?: Yes  Specific Analgesic Plan: Continue present regimen        Review of Systems    Review of Systems negative except as noted in HPI and Chief complaint.     Objective   Patient Health Questionnaire-9 Score: 4     YESENIA-7 Total Score: 4     VITALS:  /82 (BP Location: Left arm, Patient Position: Sitting, BP Cuff  "Size: Adult)   Pulse 78   Ht 1.905 m (6' 3\")   Wt 103 kg (226 lb)   SpO2 97%   BMI 28.25 kg/m²      Physical Exam  Constitutional:       General: Lito is not in acute distress.     Appearance: Normal appearance.   HENT:      Head: Normocephalic and atraumatic.      Right Ear: Tympanic membrane, ear canal and external ear normal.      Left Ear: Tympanic membrane, ear canal and external ear normal.      Nose: Nose normal.      Mouth/Throat:      Mouth: Mucous membranes are moist.      Pharynx: No oropharyngeal exudate or posterior oropharyngeal erythema.   Eyes:      Extraocular Movements: Extraocular movements intact.      Conjunctiva/sclera: Conjunctivae normal.      Pupils: Pupils are equal, round, and reactive to light.   Cardiovascular:      Rate and Rhythm: Normal rate and regular rhythm.      Heart sounds: No murmur heard.  Pulmonary:      Effort: Pulmonary effort is normal.      Breath sounds: Normal breath sounds.   Abdominal:      General: Bowel sounds are normal.      Palpations: Abdomen is soft.   Musculoskeletal:         General: Normal range of motion.      Cervical back: No rigidity.   Lymphadenopathy:      Cervical: No cervical adenopathy.   Skin:     General: Skin is warm and dry.      Findings: No rash.   Neurological:      General: No focal deficit present.      Mental Status: Lito is alert and oriented to person, place, and time.      Cranial Nerves: No cranial nerve deficit.      Gait: Gait normal.   Psychiatric:         Mood and Affect: Mood normal.         Behavior: Behavior normal.         Assessment/Plan   Problem List Items Addressed This Visit       Chronic pain     Unchanged but stable on current regimen.  Encouraging tentative follow up with pain management for possible injections with lumbar radiculopathy persisting.  The OARRS website was checked and validated.  I have personally reviewed the OARRS report for this patient.  This report has been scanned into the electronic " medical record.  I have considered the risks of abuse,, dependence, addiction and diversion.  I believe that it is clinically appropriate for this patient to be prescribed this medication.  The patient has been told not to increase dose or refill sooner than indicated.  Any variation from this practice will results in my denying further prescriptions and possible discharge from care.  Follow up 3 months.         Relevant Medications    oxyCODONE-acetaminophen (Percocet) 5-325 mg tablet    Esophageal reflux     Continue with PPI and Famotidine - follow up with GI as scheduled.  Await recommendations after EGD evaluation.         Relevant Medications    omeprazole (PriLOSEC) 40 mg DR capsule    Facet degeneration of lumbar region     As above - no changes in medication dosages,  Follow up 3 months.         Relevant Medications    oxyCODONE-acetaminophen (Percocet) 5-325 mg tablet    Discogenic low back pain     Stable on current regimen - encouraging to follow up with pain management to discuss nerve ablation or other treatment options for chronic pain.    The OARRS website was checked and validated.  I have personally reviewed the OARRS report for this patient.  This report has been scanned into the electronic medical record.  I have considered the risks of abuse,, dependence, addiction and diversion.  I believe that it is clinically appropriate for this patient to be prescribed this medication.  The patient has been told not to increase dose or refill sooner than indicated.  Any variation from this practice will results in my denying further prescriptions and possible discharge from care.   Follow up 3 months.         Relevant Medications    cyclobenzaprine (Flexeril) 10 mg tablet    oxyCODONE-acetaminophen (Percocet) 5-325 mg tablet    Lumbar degenerative disc disease     See above plans - no changes in medication doses.  Encouraging continued increased activity.           Relevant Medications    cyclobenzaprine  (Flexeril) 10 mg tablet    Eustachian tube dysfunction     Low dose Medrol - restart Fluticasone nasal spray.  Call if symptoms change or worsen.         Relevant Medications    methylPREDNISolone (Medrol Dospak) 4 mg tablets    fluticasone (Flonase) 50 mcg/actuation nasal spray    Primary hypertension     BP controlled today  Continue medications at current dosage.  Continue with diet and exercise efforts.  Follow up 3-6 months.          Other Visit Diagnoses       Annual physical exam    -  Primary    Relevant Orders    TSH with reflex to Free T4 if abnormal (Completed)    Other viral warts        Relevant Medications    imiquimod (Aldara) 5 % cream    Gout, unspecified cause, unspecified chronicity, unspecified site        Relevant Medications    allopurinol (Zyloprim) 100 mg tablet    Arthralgia, unspecified joint        Relevant Orders    Uric Acid (Completed)    C-Reactive Protein (Completed)    Sedimentation Rate (Completed)    DORIS with Reflex to ADEOLA    Rheumatoid Factor (Completed)    Diarrhea, unspecified type        Relevant Orders    Celiac Panel            FOLLOW UP 3 MONTHS, SOONER WITH ANY PROBLEMS OR CONCERNS.

## 2024-08-27 NOTE — PROGRESS NOTES
Subjective   Patient ID: Lito Unger is a 48 y.o. adult who presents for Annual Exam and Follow-up.  HPI and follow up on the following:  See additional OV notes.    SOC Hx:   Tobacco Use: Low Risk  (8/27/2024)    Patient History     Smoking Tobacco Use: Never     Smokeless Tobacco Use: Never     Passive Exposure: Not on file     Alcohol Use: Not At Risk (10/26/2023)    AUDIT-C     Frequency of Alcohol Consumption: Never     Average Number of Drinks: Patient does not drink     Frequency of Binge Drinking: Never       DIET: general    EXERCISE:  not as regularly - physical job and driving longer distances    ELIMINATION: no constipation or diarrhea  Colon CA Screening: COLONOSCOPY 4/24/2018 REPEAT DUE 10 years    URINARY SYSTEM: none  PSA   Date Value Ref Range Status   08/02/2021 0.62 0.00 - 4.00 ng/mL Final     Comment:     The FDA requires that the method used for PSA assay be   reported to the physician. Values obtained with different   assay methods must not be used interchangeably. This test   was performed at [facility] using the Siemens  [place]ta PSA method, which is a sandwich immunoassay using   chemiluminescence for quantitation. The assay is approved  for measurement of prostate-specific antigen (PSA) in   serum and may be used in conjunction with a digital rectal  examination in men 50 years and older as an aid in   detection of prostate cancer.   5-Alpha-reductase inhibitors (e.g. Proscar, Finasteride,   Avodart, Dutasteride and [place]) for the treatment of BPH   have been shown to lower PSA levels by an average of 50%   after 6 months of treatment.         SLEEP: minimally disturbed    MOODS:   Patient Health Questionnaire-9 Score: 4    YESENIA-7 Total Score: 4     Review of Systems  Review of Systems negative except as noted in HPI and Chief complaint.    Objective   Physical Exam    Assessment/Plan   Problem List Items Addressed This Visit             ICD-10-CM    Chronic pain  G89.29    Relevant Medications    oxyCODONE-acetaminophen (Percocet) 5-325 mg tablet    Esophageal reflux K21.9    Relevant Medications    omeprazole (PriLOSEC) 40 mg DR capsule    Facet degeneration of lumbar region M47.816    Relevant Medications    oxyCODONE-acetaminophen (Percocet) 5-325 mg tablet    Discogenic low back pain M51.36    Relevant Medications    cyclobenzaprine (Flexeril) 10 mg tablet    oxyCODONE-acetaminophen (Percocet) 5-325 mg tablet    Lumbar degenerative disc disease M51.36    Relevant Medications    cyclobenzaprine (Flexeril) 10 mg tablet    Eustachian tube dysfunction H69.90    Relevant Medications    methylPREDNISolone (Medrol Dospak) 4 mg tablets    fluticasone (Flonase) 50 mcg/actuation nasal spray    Primary hypertension I10     Other Visit Diagnoses         Codes    Annual physical exam    -  Primary Z00.00    Relevant Orders    TSH with reflex to Free T4 if abnormal (Completed)    Other viral warts     B07.8    Relevant Medications    imiquimod (Aldara) 5 % cream    Gout, unspecified cause, unspecified chronicity, unspecified site     M10.9    Relevant Medications    allopurinol (Zyloprim) 100 mg tablet    Arthralgia, unspecified joint     M25.50    Relevant Orders    Uric Acid (Completed)    C-Reactive Protein (Completed)    Sedimentation Rate (Completed)    DORIS with Reflex to ADEOLA    Rheumatoid Factor (Completed)    Diarrhea, unspecified type     R19.7    Relevant Orders    Celiac Panel                 Follow-up  3 MONTHS.    Jeannie Sanders DO 08/27/24 9:35 AM

## 2024-08-28 LAB — ANA SER QL HEP2 SUBST: NEGATIVE

## 2024-08-28 NOTE — ASSESSMENT & PLAN NOTE
BP controlled today  Continue medications at current dosage.  Continue with diet and exercise efforts.  Follow up 3-6 months.

## 2024-08-28 NOTE — ASSESSMENT & PLAN NOTE
Continue with PPI and Famotidine - follow up with GI as scheduled.  Await recommendations after EGD evaluation.

## 2024-08-29 LAB
COPPER SERPL-MCNC: 83.3 UG/DL (ref 70–140)
ZINC SERPL-MCNC: 72.2 UG/DL (ref 60–120)

## 2024-08-30 LAB
GLIADIN PEPTIDE IGG SER IA-ACNC: <0.56 FLU (ref 0–4.99)
TTG IGG SER IA-ACNC: <0.82 FLU (ref 0–4.99)

## 2024-08-31 LAB
ANNOTATION COMMENT IMP: NORMAL
RETINYL PALMITATE SERPL-MCNC: <0.02 MG/L (ref 0–0.1)
VIT A SERPL-MCNC: 0.34 MG/L (ref 0.3–1.2)

## 2024-09-01 LAB — VIT B1 PYROPHOSHATE BLD-SCNC: 113 NMOL/L (ref 70–180)

## 2024-09-09 ENCOUNTER — TELEPHONE (OUTPATIENT)
Dept: GASTROENTEROLOGY | Facility: HOSPITAL | Age: 49
End: 2024-09-09
Payer: COMMERCIAL

## 2024-09-10 ENCOUNTER — ANESTHESIA EVENT (OUTPATIENT)
Dept: GASTROENTEROLOGY | Facility: HOSPITAL | Age: 49
End: 2024-09-10
Payer: COMMERCIAL

## 2024-09-11 ENCOUNTER — HOSPITAL ENCOUNTER (OUTPATIENT)
Dept: GASTROENTEROLOGY | Facility: HOSPITAL | Age: 49
Setting detail: OUTPATIENT SURGERY
Discharge: HOME | End: 2024-09-11
Payer: COMMERCIAL

## 2024-09-11 ENCOUNTER — ANESTHESIA (OUTPATIENT)
Dept: GASTROENTEROLOGY | Facility: HOSPITAL | Age: 49
End: 2024-09-11
Payer: COMMERCIAL

## 2024-09-11 VITALS
HEIGHT: 75 IN | BODY MASS INDEX: 27.35 KG/M2 | RESPIRATION RATE: 15 BRPM | TEMPERATURE: 97.2 F | SYSTOLIC BLOOD PRESSURE: 141 MMHG | OXYGEN SATURATION: 99 % | WEIGHT: 220 LBS | HEART RATE: 59 BPM | DIASTOLIC BLOOD PRESSURE: 93 MMHG

## 2024-09-11 DIAGNOSIS — R10.13 DYSPEPSIA: ICD-10-CM

## 2024-09-11 DIAGNOSIS — K21.9 GASTROESOPHAGEAL REFLUX DISEASE, UNSPECIFIED WHETHER ESOPHAGITIS PRESENT: ICD-10-CM

## 2024-09-11 DIAGNOSIS — E87.6 HYPOKALEMIA: ICD-10-CM

## 2024-09-11 DIAGNOSIS — E55.9 VITAMIN D DEFICIENCY: ICD-10-CM

## 2024-09-11 DIAGNOSIS — Z98.84 STATUS POST BARIATRIC SURGERY: Primary | ICD-10-CM

## 2024-09-11 DIAGNOSIS — R10.13 EPIGASTRIC PAIN: ICD-10-CM

## 2024-09-11 DIAGNOSIS — K28.9 ULCER JEJUNUM: ICD-10-CM

## 2024-09-11 PROCEDURE — 96372 THER/PROPH/DIAG INJ SC/IM: CPT | Performed by: SURGERY

## 2024-09-11 PROCEDURE — 43245 EGD DILATE STRICTURE: CPT | Performed by: SURGERY

## 2024-09-11 PROCEDURE — 3700000002 HC GENERAL ANESTHESIA TIME - EACH INCREMENTAL 1 MINUTE: Performed by: SURGERY

## 2024-09-11 PROCEDURE — 43236 UPPR GI SCOPE W/SUBMUC INJ: CPT | Performed by: SURGERY

## 2024-09-11 PROCEDURE — 2720000007 HC OR 272 NO HCPCS: Performed by: SURGERY

## 2024-09-11 PROCEDURE — 2500000004 HC RX 250 GENERAL PHARMACY W/ HCPCS (ALT 636 FOR OP/ED)

## 2024-09-11 PROCEDURE — 7100000009 HC PHASE TWO TIME - INITIAL BASE CHARGE: Performed by: SURGERY

## 2024-09-11 PROCEDURE — 7100000010 HC PHASE TWO TIME - EACH INCREMENTAL 1 MINUTE: Performed by: SURGERY

## 2024-09-11 PROCEDURE — C1726 CATH, BAL DIL, NON-VASCULAR: HCPCS | Performed by: SURGERY

## 2024-09-11 PROCEDURE — 2500000004 HC RX 250 GENERAL PHARMACY W/ HCPCS (ALT 636 FOR OP/ED): Performed by: SURGERY

## 2024-09-11 PROCEDURE — 43239 EGD BIOPSY SINGLE/MULTIPLE: CPT | Performed by: SURGERY

## 2024-09-11 PROCEDURE — 3700000001 HC GENERAL ANESTHESIA TIME - INITIAL BASE CHARGE: Performed by: SURGERY

## 2024-09-11 RX ORDER — TRIAMCINOLONE ACETONIDE 40 MG/ML
INJECTION, SUSPENSION INTRA-ARTICULAR; INTRAMUSCULAR AS NEEDED
Status: COMPLETED | OUTPATIENT
Start: 2024-09-11 | End: 2024-09-11

## 2024-09-11 RX ORDER — SODIUM CHLORIDE, SODIUM LACTATE, POTASSIUM CHLORIDE, CALCIUM CHLORIDE 600; 310; 30; 20 MG/100ML; MG/100ML; MG/100ML; MG/100ML
20 INJECTION, SOLUTION INTRAVENOUS CONTINUOUS
Status: DISCONTINUED | OUTPATIENT
Start: 2024-09-11 | End: 2024-09-12 | Stop reason: HOSPADM

## 2024-09-11 RX ORDER — LIDOCAINE HYDROCHLORIDE 10 MG/ML
0.1 INJECTION, SOLUTION EPIDURAL; INFILTRATION; INTRACAUDAL; PERINEURAL ONCE
Status: DISCONTINUED | OUTPATIENT
Start: 2024-09-11 | End: 2024-09-12 | Stop reason: HOSPADM

## 2024-09-11 RX ORDER — PROPOFOL 10 MG/ML
INJECTION, EMULSION INTRAVENOUS CONTINUOUS PRN
Status: DISCONTINUED | OUTPATIENT
Start: 2024-09-11 | End: 2024-09-11

## 2024-09-11 RX ORDER — MIDAZOLAM HYDROCHLORIDE 1 MG/ML
INJECTION INTRAMUSCULAR; INTRAVENOUS AS NEEDED
Status: DISCONTINUED | OUTPATIENT
Start: 2024-09-11 | End: 2024-09-11

## 2024-09-11 RX ORDER — SODIUM CHLORIDE, SODIUM LACTATE, POTASSIUM CHLORIDE, CALCIUM CHLORIDE 600; 310; 30; 20 MG/100ML; MG/100ML; MG/100ML; MG/100ML
100 INJECTION, SOLUTION INTRAVENOUS CONTINUOUS
Status: DISCONTINUED | OUTPATIENT
Start: 2024-09-11 | End: 2024-09-12 | Stop reason: HOSPADM

## 2024-09-11 RX ORDER — SUCRALFATE 1 G/1
1 TABLET ORAL
COMMUNITY

## 2024-09-11 RX ORDER — ONDANSETRON HYDROCHLORIDE 2 MG/ML
4 INJECTION, SOLUTION INTRAVENOUS ONCE AS NEEDED
Status: DISCONTINUED | OUTPATIENT
Start: 2024-09-11 | End: 2024-09-12 | Stop reason: HOSPADM

## 2024-09-11 SDOH — HEALTH STABILITY: MENTAL HEALTH: CURRENT SMOKER: 0

## 2024-09-11 ASSESSMENT — PAIN SCALES - GENERAL
PAINLEVEL_OUTOF10: 0 - NO PAIN
PAINLEVEL_OUTOF10: 5 - MODERATE PAIN
PAINLEVEL_OUTOF10: 4
PAINLEVEL_OUTOF10: 5 - MODERATE PAIN

## 2024-09-11 ASSESSMENT — PAIN DESCRIPTION - DESCRIPTORS: DESCRIPTORS: ACHING;SHARP

## 2024-09-11 ASSESSMENT — PAIN - FUNCTIONAL ASSESSMENT: PAIN_FUNCTIONAL_ASSESSMENT: 0-10

## 2024-09-11 NOTE — ANESTHESIA PREPROCEDURE EVALUATION
Patient: Lito Unger    Procedure Information       Date/Time: 09/11/24 0830    Scheduled providers: Rosy Handley MD MPH    Procedure: EGD    Location: [facility]          Vitals:    09/11/24 0807   BP: (!) 142/95   Pulse: 56   Resp: 18   Temp: 36.2 °C (97.2 °F)   SpO2: 98%       Past Surgical History:   Procedure Laterality Date   • APPENDECTOMY  09/17/2013    Laparoscopic Appendectomy   • GASTRIC BYPASS  08/21/2018    Gastric Surgery For Morbid Obesity Bypass With Neymar-en-Y   • KNEE CARTILAGE SURGERY Left    • OTHER SURGICAL HISTORY  10/27/2014    Incisional Hernia Repair   • SHOULDER SURGERY  10/27/2014    Shoulder Surgery   • TONSILLECTOMY  10/27/2014    Tonsillectomy   • UPPER GASTROINTESTINAL ENDOSCOPY N/A      Past Medical History:   Diagnosis Date   • Abnormal weight loss 10/16/2018    Rapid weight loss   • Acute bronchospasm 02/23/2018    Post-infection bronchospasm   • Anesthesia of skin     Numbness, limb   • Anxiety disorder, unspecified 08/30/2015    Anxiety and depression   • Biomechanical lesion, unspecified 04/16/2015    Nonallopathic lesion of rib cage   • Body mass index (BMI) 38.0-38.9, adult 07/05/2018    BMI 38.0-38.9,adult   • Carpal tunnel syndrome, bilateral upper limbs 05/23/2017    Bilateral carpal tunnel syndrome   • Chronic gout, unspecified, without tophus (tophi) 09/03/2019    Chronic gout   • Cramp and spasm 09/08/2017    Muscle cramping   • Dietary counseling and surveillance     Pre-bariatric surgery nutrition evaluation   • GERD (gastroesophageal reflux disease)    • Idiopathic gout, right hand 10/14/2019    Idiopathic gout of right hand, unspecified chronicity   • Morbid (severe) obesity due to excess calories (Multi) 05/31/2017    Obesity, morbid, BMI 40.0-49.9   • Other acute postprocedural pain     Post-op pain   • Other intervertebral disc degeneration, lumbar region 04/19/2016    Other intervertebral disc degeneration, lumbar region   • Other specified  anxiety disorders 05/31/2017    Other specified anxiety disorders   • Other symptoms and signs involving the genitourinary system 11/04/2016    Bladder pain   • Pain in leg, unspecified 09/15/2013    Lower extremity pain   • Personal history of other benign neoplasm 06/27/2019    History of other benign neoplasm   • Personal history of other diseases of the circulatory system 09/26/2013    History of hypertension   • Personal history of other diseases of the digestive system 10/08/2015    History of esophageal reflux   • Personal history of other diseases of the digestive system 08/01/2018    History of umbilical hernia   • Personal history of other diseases of the musculoskeletal system and connective tissue 12/19/2016    History of joint pain   • Personal history of other diseases of the musculoskeletal system and connective tissue 09/01/2017    History of back pain   • Personal history of other diseases of the musculoskeletal system and connective tissue 09/08/2017    History of muscle spasm   • Personal history of other diseases of the nervous system and sense organs     History of sleep apnea   • Personal history of other diseases of the respiratory system 01/07/2020    History of sinusitis   • Personal history of other diseases of urinary system 11/04/2016    History of hematuria   • Personal history of other diseases of urinary system 08/02/2021    History of hematuria   • Personal history of other endocrine, nutritional and metabolic disease 05/16/2017    History of hypokalemia   • Personal history of other endocrine, nutritional and metabolic disease 04/16/2015    History of hypokalemia   • Personal history of other endocrine, nutritional and metabolic disease     History of dehydration   • Personal history of other mental and behavioral disorders     History of depression   • Personal history of other specified conditions 12/07/2015    History of paresthesia   • Personal history of other specified  conditions 10/29/2014    History of weight gain   • Personal history of other specified conditions 10/29/2015    History of headache   • Personal history of other specified conditions     History of heartburn   • Personal history of other specified conditions 02/23/2018    History of abdominal pain   • Personal history of other specified conditions 03/11/2017    History of polyuria   • Personal history of other specified conditions 11/04/2016    History of gross hematuria   • Personal history of other specified conditions 07/21/2021    History of dysuria   • Personal history of other specified conditions 07/21/2021    History of left flank pain   • Personal history of other specified conditions 10/24/2016    History of dysuria   • Personal history of other specified conditions 11/12/2013    History of breast lump   • Personal history of other specified conditions 02/23/2018    History of abdominal pain   • Personal history of urinary (tract) infections 09/26/2013    History of cystitis   • Snoring     Snoring   • Sprain of ligaments of lumbar spine, initial encounter 12/19/2016    Low back sprain   • Strain of muscle, fascia and tendon at neck level, initial encounter 12/07/2018    Cervical strain   • Unspecified disturbances of skin sensation 10/14/2019    Paresthesias/numbness       Current Outpatient Medications:   •  allopurinol (Zyloprim) 100 mg tablet, Take 4 tablets (400 mg) by mouth once daily., Disp: 360 tablet, Rfl: 3  •  cyclobenzaprine (Flexeril) 10 mg tablet, Take 1 tablet (10 mg) by mouth 3 times a day as needed for muscle spasms., Disp: 90 tablet, Rfl: 1  •  famotidine (Pepcid) 40 mg tablet, Take 1 tablet (40 mg) by mouth 2 times a day., Disp: 180 tablet, Rfl: 3  •  fluticasone (Flonase) 50 mcg/actuation nasal spray, Administer 2 sprays into each nostril once daily. Shake gently. Before first use, prime pump. After use, clean tip and replace cap., Disp: 16 g, Rfl: 5  •  imiquimod (Aldara) 5 % cream,  Apply 1 packet topically 3 times a week., Disp: 12 packet, Rfl: 1  •  metaxalone (Skelaxin) 800 mg tablet, Take 1 tablet (800 mg) by mouth 2 times a day., Disp: 30 tablet, Rfl: 3  •  omeprazole (PriLOSEC) 40 mg DR capsule, Take 1 capsule (40 mg) by mouth 2 times a day before meals., Disp: 180 capsule, Rfl: 3  •  oxyCODONE-acetaminophen (Percocet) 5-325 mg tablet, Take 1-2 tablets by mouth every 6 hours if needed for severe pain (7 - 10)., Disp: 60 tablet, Rfl: 0  •  sucralfate (Carafate) 1 gram tablet, Take 1 tablet (1 g) by mouth 3 times a day before meals., Disp: , Rfl:   •  naloxone (Narcan) 4 mg/0.1 mL nasal spray, Administer 1 spray (4 mg) into affected nostril(s) if needed for opioid reversal., Disp: 2 each, Rfl: 0    Current Facility-Administered Medications:   •  lactated Ringer's infusion, 20 mL/hr, intravenous, Continuous, Nile Augustine MD  Prior to Admission medications    Medication Sig Start Date End Date Taking? Authorizing Provider   allopurinol (Zyloprim) 100 mg tablet Take 4 tablets (400 mg) by mouth once daily. 8/27/24  Yes Jeannie Sanders DO   cyclobenzaprine (Flexeril) 10 mg tablet Take 1 tablet (10 mg) by mouth 3 times a day as needed for muscle spasms. 8/27/24  Yes Jeannie Sanders DO   famotidine (Pepcid) 40 mg tablet Take 1 tablet (40 mg) by mouth 2 times a day. 4/25/24 4/25/25 Yes Jeannie Sanders DO   fluticasone (Flonase) 50 mcg/actuation nasal spray Administer 2 sprays into each nostril once daily. Shake gently. Before first use, prime pump. After use, clean tip and replace cap. 8/27/24 8/27/25 Yes Jeannie Sanders DO   imiquimod (Aldara) 5 % cream Apply 1 packet topically 3 times a week. 8/28/24  Yes Jeannie Sanders DO   metaxalone (Skelaxin) 800 mg tablet Take 1 tablet (800 mg) by mouth 2 times a day. 5/31/24  Yes Jeannie Sanders, DO   omeprazole (PriLOSEC) 40 mg DR capsule Take 1 capsule (40 mg) by mouth 2 times a day before meals. 8/27/24  Yes Jaennie Sanders, DO   oxyCODONE-acetaminophen  (Percocet) 5-325 mg tablet Take 1-2 tablets by mouth every 6 hours if needed for severe pain (7 - 10). 8/27/24 9/26/24 Yes Jeannie Sanders DO   sucralfate (Carafate) 1 gram tablet Take 1 tablet (1 g) by mouth 3 times a day before meals.   Yes Historical Provider, MD   naloxone (Narcan) 4 mg/0.1 mL nasal spray Administer 1 spray (4 mg) into affected nostril(s) if needed for opioid reversal. 1/26/24   Jeannie Sanders DO     Allergies   Allergen Reactions   • Ciprofloxacin Tinnitus   • Levofloxacin Tinnitus   • Nsaids (Non-Steroidal Anti-Inflammatory Drug) Other   • Adhesive Tape-Silicones Rash     Clear/transparent tape only and some bandaides   • Gloves, Latex With Aloe Vera Rash   • Latex Rash     Social History     Tobacco Use   • Smoking status: Never   • Smokeless tobacco: Never   Substance Use Topics   • Alcohol use: Not Currently         Chemistry    Lab Results   Component Value Date/Time     03/04/2024 1242    K 4.7 03/04/2024 1242     03/04/2024 1242    CO2 34 (H) 03/04/2024 1242    BUN 5 (L) 03/04/2024 1242    CREATININE 0.83 03/04/2024 1242    Lab Results   Component Value Date/Time    CALCIUM 10.1 03/04/2024 1242    ALKPHOS 78 03/04/2024 1242    AST 23 03/04/2024 1242    ALT 19 03/04/2024 1242    BILITOT 0.6 03/04/2024 1242          Lab Results   Component Value Date/Time    WBC 5.2 08/27/2024 0947    HGB 14.0 08/27/2024 0947    HCT 42.4 08/27/2024 0947     08/27/2024 0947     Lab Results   Component Value Date/Time    PROTIME 11.1 03/30/2021 1006    INR 1.0 03/30/2021 1006     No results found for this or any previous visit (from the past 4464 hour(s)).  No results found for this or any previous visit from the past 1095 days.        Relevant Problems   Cardiac   (+) Primary hypertension      GI   (+) Esophageal reflux      Musculoskeletal   (+) Facet degeneration of lumbar region   (+) Gout, arthropathy   (+) Lumbar degenerative disc disease   (+) Lumbar spondylosis       Clinical  information reviewed:   Tobacco  Allergies  Meds   Med Hx  Surg Hx   Fam Hx  Soc Hx        NPO Detail:  NPO/Void Status  Carbohydrate Drink Given Prior to Surgery? : N  Date of Last Liquid: 09/11/24  Time of Last Liquid: 0500  Date of Last Solid: 09/10/24  Time of Last Solid: 2200  Last Intake Type: Clear fluids  Time of Last Void: 0756         Physical Exam    Airway  Mallampati: I  TM distance: >3 FB  Neck ROM: full     Cardiovascular   Rhythm: regular  Rate: normal     Dental - normal exam     Pulmonary   Breath sounds clear to auscultation     Abdominal        Anesthesia Plan    History of general anesthesia?: yes  History of complications of general anesthesia?: no    ASA 2     MAC     The patient is not a current smoker.    intravenous induction   Anesthetic plan and risks discussed with patient.    Plan discussed with CAA.

## 2024-09-11 NOTE — ANESTHESIA POSTPROCEDURE EVALUATION
Patient: Lito Unger    Procedure Summary       Date: 09/11/24 Room / Location: [facility]    Anesthesia Start: 0925 Anesthesia Stop: 1000    Procedure: EGD Diagnosis:       Status post bariatric surgery      Epigastric pain      Gastroesophageal reflux disease, unspecified whether esophagitis present      Hypokalemia      Dyspepsia      Ulcer jejunum      Vitamin D deficiency      Status post bariatric surgery    Scheduled Providers: Rosy Handley MD MPH Responsible Provider: Cyndi Rm MD    Anesthesia Type: MAC ASA Status: 2            Anesthesia Type: MAC    Vitals Value Taken Time   /89 09/11/24 1005   Temp 35.7 °C (96.3 °F) 09/11/24 1005   Pulse 72 09/11/24 1005   Resp 15 09/11/24 1005   SpO2 98 % 09/11/24 1005       Anesthesia Post Evaluation    Patient participation: complete - patient participated  Level of consciousness: awake and alert  Pain management: adequate  Airway patency: patent  Cardiovascular status: acceptable  Respiratory status: acceptable  Hydration status: acceptable  Postoperative Nausea and Vomiting: none    There were no known notable events for this encounter.

## 2024-09-24 DIAGNOSIS — M47.816 FACET DEGENERATION OF LUMBAR REGION: ICD-10-CM

## 2024-09-24 DIAGNOSIS — M51.36 DISCOGENIC LOW BACK PAIN: ICD-10-CM

## 2024-09-24 DIAGNOSIS — M51.36 LUMBAR DEGENERATIVE DISC DISEASE: ICD-10-CM

## 2024-09-24 DIAGNOSIS — G89.29 OTHER CHRONIC PAIN: ICD-10-CM

## 2024-09-24 RX ORDER — CYCLOBENZAPRINE HCL 10 MG
10 TABLET ORAL 3 TIMES DAILY PRN
Qty: 90 TABLET | Refills: 1 | Status: SHIPPED | OUTPATIENT
Start: 2024-09-24

## 2024-09-24 RX ORDER — OXYCODONE AND ACETAMINOPHEN 5; 325 MG/1; MG/1
1-2 TABLET ORAL EVERY 6 HOURS PRN
Qty: 60 TABLET | Refills: 0 | Status: SHIPPED | OUTPATIENT
Start: 2024-09-24 | End: 2024-10-24

## 2024-10-15 ENCOUNTER — TELEPHONE (OUTPATIENT)
Dept: GASTROENTEROLOGY | Facility: HOSPITAL | Age: 49
End: 2024-10-15
Payer: COMMERCIAL

## 2024-10-15 ENCOUNTER — ANESTHESIA EVENT (OUTPATIENT)
Dept: GASTROENTEROLOGY | Facility: HOSPITAL | Age: 49
End: 2024-10-15

## 2024-10-16 ENCOUNTER — HOSPITAL ENCOUNTER (OUTPATIENT)
Dept: GASTROENTEROLOGY | Facility: HOSPITAL | Age: 49
Setting detail: OUTPATIENT SURGERY
Discharge: HOME | End: 2024-10-16

## 2024-10-16 ENCOUNTER — ANESTHESIA (OUTPATIENT)
Dept: GASTROENTEROLOGY | Facility: HOSPITAL | Age: 49
End: 2024-10-16

## 2024-10-16 VITALS
HEIGHT: 75 IN | BODY MASS INDEX: 27.35 KG/M2 | DIASTOLIC BLOOD PRESSURE: 93 MMHG | HEART RATE: 50 BPM | OXYGEN SATURATION: 98 % | TEMPERATURE: 96.8 F | RESPIRATION RATE: 17 BRPM | SYSTOLIC BLOOD PRESSURE: 133 MMHG | WEIGHT: 220 LBS

## 2024-10-16 DIAGNOSIS — Z98.84 STATUS POST BARIATRIC SURGERY: Primary | ICD-10-CM

## 2024-10-16 DIAGNOSIS — R10.13 EPIGASTRIC PAIN: ICD-10-CM

## 2024-10-16 DIAGNOSIS — K28.9 ULCER JEJUNUM: ICD-10-CM

## 2024-10-16 DIAGNOSIS — K21.9 GASTROESOPHAGEAL REFLUX DISEASE, UNSPECIFIED WHETHER ESOPHAGITIS PRESENT: ICD-10-CM

## 2024-10-16 DIAGNOSIS — E55.9 VITAMIN D DEFICIENCY: ICD-10-CM

## 2024-10-16 DIAGNOSIS — R10.13 DYSPEPSIA: ICD-10-CM

## 2024-10-16 DIAGNOSIS — E87.6 HYPOKALEMIA: ICD-10-CM

## 2024-10-16 PROCEDURE — 7100000009 HC PHASE TWO TIME - INITIAL BASE CHARGE: Performed by: SURGERY

## 2024-10-16 PROCEDURE — 2720000007 HC OR 272 NO HCPCS: Performed by: SURGERY

## 2024-10-16 PROCEDURE — 7100000010 HC PHASE TWO TIME - EACH INCREMENTAL 1 MINUTE: Performed by: SURGERY

## 2024-10-16 PROCEDURE — 43249 ESOPH EGD DILATION <30 MM: CPT | Performed by: SURGERY

## 2024-10-16 PROCEDURE — 43235 EGD DIAGNOSTIC BRUSH WASH: CPT | Performed by: SURGERY

## 2024-10-16 PROCEDURE — 2500000004 HC RX 250 GENERAL PHARMACY W/ HCPCS (ALT 636 FOR OP/ED)

## 2024-10-16 PROCEDURE — 3700000001 HC GENERAL ANESTHESIA TIME - INITIAL BASE CHARGE: Performed by: SURGERY

## 2024-10-16 PROCEDURE — 3700000002 HC GENERAL ANESTHESIA TIME - EACH INCREMENTAL 1 MINUTE: Performed by: SURGERY

## 2024-10-16 RX ORDER — ALBUTEROL SULFATE 0.83 MG/ML
2.5 SOLUTION RESPIRATORY (INHALATION) ONCE AS NEEDED
OUTPATIENT
Start: 2024-10-16

## 2024-10-16 RX ORDER — DROPERIDOL 2.5 MG/ML
0.62 INJECTION, SOLUTION INTRAMUSCULAR; INTRAVENOUS ONCE AS NEEDED
OUTPATIENT
Start: 2024-10-16

## 2024-10-16 RX ORDER — SODIUM CHLORIDE, SODIUM LACTATE, POTASSIUM CHLORIDE, CALCIUM CHLORIDE 600; 310; 30; 20 MG/100ML; MG/100ML; MG/100ML; MG/100ML
INJECTION, SOLUTION INTRAVENOUS CONTINUOUS PRN
Status: DISCONTINUED | OUTPATIENT
Start: 2024-10-16 | End: 2024-10-16

## 2024-10-16 RX ORDER — ONDANSETRON HYDROCHLORIDE 2 MG/ML
4 INJECTION, SOLUTION INTRAVENOUS ONCE AS NEEDED
OUTPATIENT
Start: 2024-10-16

## 2024-10-16 RX ORDER — LIDOCAINE HYDROCHLORIDE 20 MG/ML
INJECTION, SOLUTION INFILTRATION; PERINEURAL AS NEEDED
Status: DISCONTINUED | OUTPATIENT
Start: 2024-10-16 | End: 2024-10-16

## 2024-10-16 RX ORDER — LIDOCAINE HYDROCHLORIDE 10 MG/ML
0.1 INJECTION, SOLUTION EPIDURAL; INFILTRATION; INTRACAUDAL; PERINEURAL ONCE
OUTPATIENT
Start: 2024-10-16 | End: 2024-10-16

## 2024-10-16 RX ORDER — MIDAZOLAM HYDROCHLORIDE 1 MG/ML
INJECTION INTRAMUSCULAR; INTRAVENOUS AS NEEDED
Status: DISCONTINUED | OUTPATIENT
Start: 2024-10-16 | End: 2024-10-16

## 2024-10-16 RX ORDER — SODIUM CHLORIDE, SODIUM LACTATE, POTASSIUM CHLORIDE, CALCIUM CHLORIDE 600; 310; 30; 20 MG/100ML; MG/100ML; MG/100ML; MG/100ML
50 INJECTION, SOLUTION INTRAVENOUS CONTINUOUS
OUTPATIENT
Start: 2024-10-16 | End: 2024-10-17

## 2024-10-16 RX ORDER — LABETALOL HYDROCHLORIDE 5 MG/ML
5 INJECTION, SOLUTION INTRAVENOUS ONCE AS NEEDED
OUTPATIENT
Start: 2024-10-16

## 2024-10-16 RX ORDER — PROPOFOL 10 MG/ML
INJECTION, EMULSION INTRAVENOUS AS NEEDED
Status: DISCONTINUED | OUTPATIENT
Start: 2024-10-16 | End: 2024-10-16

## 2024-10-16 ASSESSMENT — ENCOUNTER SYMPTOMS
PSYCHIATRIC NEGATIVE: 1
CONSTITUTIONAL NEGATIVE: 1
MUSCULOSKELETAL NEGATIVE: 1
CARDIOVASCULAR NEGATIVE: 1
GASTROINTESTINAL NEGATIVE: 1
RESPIRATORY NEGATIVE: 1
NEUROLOGICAL NEGATIVE: 1

## 2024-10-16 ASSESSMENT — PAIN - FUNCTIONAL ASSESSMENT
PAIN_FUNCTIONAL_ASSESSMENT: 0-10
PAIN_FUNCTIONAL_ASSESSMENT: 0-10
PAIN_FUNCTIONAL_ASSESSMENT: UNABLE TO SELF-REPORT
PAIN_FUNCTIONAL_ASSESSMENT: 0-10

## 2024-10-16 ASSESSMENT — PAIN SCALES - GENERAL
PAINLEVEL_OUTOF10: 5 - MODERATE PAIN
PAINLEVEL_OUTOF10: 0 - NO PAIN
PAIN_LEVEL: 0
PAINLEVEL_OUTOF10: 0 - NO PAIN

## 2024-10-16 ASSESSMENT — COLUMBIA-SUICIDE SEVERITY RATING SCALE - C-SSRS
1. IN THE PAST MONTH, HAVE YOU WISHED YOU WERE DEAD OR WISHED YOU COULD GO TO SLEEP AND NOT WAKE UP?: NO
2. HAVE YOU ACTUALLY HAD ANY THOUGHTS OF KILLING YOURSELF?: NO
6. HAVE YOU EVER DONE ANYTHING, STARTED TO DO ANYTHING, OR PREPARED TO DO ANYTHING TO END YOUR LIFE?: NO

## 2024-10-16 NOTE — DISCHARGE INSTRUCTIONS

## 2024-10-16 NOTE — ANESTHESIA POSTPROCEDURE EVALUATION
Patient: Lito Unger    Procedure Summary       Date: 10/16/24 Room / Location: [facility]    Anesthesia Start: 0915 Anesthesia Stop: 0947    Procedure: EGD Diagnosis:       Status post bariatric surgery      Epigastric pain      Gastroesophageal reflux disease, unspecified whether esophagitis present      Hypokalemia      Dyspepsia      Ulcer jejunum      Vitamin D deficiency      Status post bariatric surgery    Scheduled Providers: Rosy Handley MD MPH; Varsha Zamorano MD Responsible Provider: Varsha Zamorano MD    Anesthesia Type: MAC ASA Status: 2            Anesthesia Type: MAC    Vitals Value Taken Time   /93 10/16/24 1000   Temp 36 °C (96.8 °F) 10/16/24 0945   Pulse 48 10/16/24 1000   Resp 16 10/16/24 1000   SpO2 98 % 10/16/24 1000       Anesthesia Post Evaluation    Patient location during evaluation: PACU  Patient participation: complete - patient participated  Level of consciousness: sleepy but conscious  Pain score: 0  Pain management: adequate  Airway patency: patent  Cardiovascular status: acceptable, blood pressure returned to baseline and hemodynamically stable  Respiratory status: acceptable  Hydration status: acceptable  Postoperative Nausea and Vomiting: none        No notable events documented.

## 2024-10-16 NOTE — H&P
History Of Present Illness  Lito Unger is a 49 y.o. adult presenting who is about 6 years status post laparoscopic Neymar-en-Y gastric bypass, who is having some dysphagia and pain/nausea when eating.  He did have reflux prior to his gastric bypass, with most of his reflux symptoms going away.  No pulmonary issues.  He takes antacids twice a day. Also takes carafate. He notes his swallowing is better but he still continues to have issues.  He has been having sequential dilations.  He had an EGD on 9/11/2024, with Botox injection and dilation up to 18 mm with Kenalog injection at the stricture. This helped for a few weeks, but he now is nauseous again after eating. He now presents for subsequent EGD and dilation..     Past Medical History  Past Medical History:   Diagnosis Date    Abnormal weight loss 10/16/2018    Rapid weight loss    Acute bronchospasm 02/23/2018    Post-infection bronchospasm    Anesthesia of skin     Numbness, limb    Anxiety disorder, unspecified 08/30/2015    Anxiety and depression    Biomechanical lesion, unspecified 04/16/2015    Nonallopathic lesion of rib cage    Body mass index (BMI) 38.0-38.9, adult 07/05/2018    BMI 38.0-38.9,adult    Carpal tunnel syndrome, bilateral upper limbs 05/23/2017    Bilateral carpal tunnel syndrome    Chronic gout, unspecified, without tophus (tophi) 09/03/2019    Chronic gout    Cramp and spasm 09/08/2017    Muscle cramping    Dietary counseling and surveillance     Pre-bariatric surgery nutrition evaluation    GERD (gastroesophageal reflux disease)     Idiopathic gout, right hand 10/14/2019    Idiopathic gout of right hand, unspecified chronicity    Morbid (severe) obesity due to excess calories (Multi) 05/31/2017    Obesity, morbid, BMI 40.0-49.9    Other acute postprocedural pain     Post-op pain    Other intervertebral disc degeneration, lumbar region 04/19/2016    Other intervertebral disc degeneration, lumbar region    Other specified anxiety  disorders 05/31/2017    Other specified anxiety disorders    Other symptoms and signs involving the genitourinary system 11/04/2016    Bladder pain    Pain in leg, unspecified 09/15/2013    Lower extremity pain    Personal history of other benign neoplasm 06/27/2019    History of other benign neoplasm    Personal history of other diseases of the circulatory system 09/26/2013    History of hypertension    Personal history of other diseases of the digestive system 10/08/2015    History of esophageal reflux    Personal history of other diseases of the digestive system 08/01/2018    History of umbilical hernia    Personal history of other diseases of the musculoskeletal system and connective tissue 12/19/2016    History of joint pain    Personal history of other diseases of the musculoskeletal system and connective tissue 09/01/2017    History of back pain    Personal history of other diseases of the musculoskeletal system and connective tissue 09/08/2017    History of muscle spasm    Personal history of other diseases of the nervous system and sense organs     History of sleep apnea    Personal history of other diseases of the respiratory system 01/07/2020    History of sinusitis    Personal history of other diseases of urinary system 11/04/2016    History of hematuria    Personal history of other diseases of urinary system 08/02/2021    History of hematuria    Personal history of other endocrine, nutritional and metabolic disease 05/16/2017    History of hypokalemia    Personal history of other endocrine, nutritional and metabolic disease 04/16/2015    History of hypokalemia    Personal history of other endocrine, nutritional and metabolic disease     History of dehydration    Personal history of other mental and behavioral disorders     History of depression    Personal history of other specified conditions 12/07/2015    History of paresthesia    Personal history of other specified conditions 10/29/2014    History of  weight gain    Personal history of other specified conditions 10/29/2015    History of headache    Personal history of other specified conditions     History of heartburn    Personal history of other specified conditions 02/23/2018    History of abdominal pain    Personal history of other specified conditions 03/11/2017    History of polyuria    Personal history of other specified conditions 11/04/2016    History of gross hematuria    Personal history of other specified conditions 07/21/2021    History of dysuria    Personal history of other specified conditions 07/21/2021    History of left flank pain    Personal history of other specified conditions 10/24/2016    History of dysuria    Personal history of other specified conditions 11/12/2013    History of breast lump    Personal history of other specified conditions 02/23/2018    History of abdominal pain    Personal history of urinary (tract) infections 09/26/2013    History of cystitis    Snoring     Snoring    Sprain of ligaments of lumbar spine, initial encounter 12/19/2016    Low back sprain    Strain of muscle, fascia and tendon at neck level, initial encounter 12/07/2018    Cervical strain    Unspecified disturbances of skin sensation 10/14/2019    Paresthesias/numbness       Surgical History  Past Surgical History:   Procedure Laterality Date    APPENDECTOMY  09/17/2013    Laparoscopic Appendectomy    GASTRIC BYPASS  08/21/2018    Gastric Surgery For Morbid Obesity Bypass With Neymar-en-Y    KNEE CARTILAGE SURGERY Left     OTHER SURGICAL HISTORY  10/27/2014    Incisional Hernia Repair    SHOULDER SURGERY  10/27/2014    Shoulder Surgery    TONSILLECTOMY  10/27/2014    Tonsillectomy    UPPER GASTROINTESTINAL ENDOSCOPY N/A         Social History  Lito reports that Lito has never smoked. Lito has never used smokeless tobacco. Lito reports that Lito does not currently use alcohol. Lito reports that Lito does not use drugs.    Family  "History  No family history on file.     Allergies  Ciprofloxacin; Levofloxacin; Nsaids (non-steroidal anti-inflammatory drug); Adhesive tape-silicones; Gloves, latex with aloe vera; and Latex    Review of Systems   Constitutional: Negative.    HENT: Negative.     Respiratory: Negative.     Cardiovascular: Negative.    Gastrointestinal: Negative.    Genitourinary: Negative.    Musculoskeletal: Negative.    Skin: Negative.    Neurological: Negative.    Psychiatric/Behavioral: Negative.          Physical Exam  Physical Exam:   Constitutional: Well developed, awake/alert/oriented x3, no distress, alert and cooperative  Eyes: PERRL, EOMI, clear sclera  Head/Neck: Neck supple, no apparent injury, No JVD, trachea midline  Respiratory/Thorax: good chest expansion, thorax symmetric  Cardiovascular: Regular, rate and rhythm,  2+ equal pulses of the extremities  Gastrointestinal: Nondistended, soft, minimally tender, no rebound tenderness or guarding, no masses palpable  Musculoskeletal: ROM intact, no joint swelling, normal strength  Extremities: normal extremities, no cyanosis edema, contusions or wounds, no clubbing  Neurological: alert and oriented x3, intact senses,  Psychological: Appropriate mood and behavior  Skin: Warm and dry, no lesions, no rashes      Last Recorded Vitals  Blood pressure (!) 152/101, pulse 59, temperature 36.2 °C (97.2 °F), temperature source Temporal, resp. rate 16, height 1.905 m (6' 3\"), weight 99.8 kg (220 lb), SpO2 100%.    Relevant Results           Assessment/Plan   Assessment & Plan  Status post bariatric surgery    Epigastric pain    Gastroesophageal reflux disease, unspecified whether esophagitis present    Hypokalemia    Dyspepsia    Ulcer jejunum    Vitamin D deficiency      49-year-old male with history of gastric bypass with stricture at the gastrojejunostomy.  Presents for EGD with dilation.  Risk and benefits discussed.  Anticipate discharge home post procedurally.       I spent 30 " minutes in the professional and overall care of this patient.      Nile Augustine MD

## 2024-10-16 NOTE — ANESTHESIA PREPROCEDURE EVALUATION
Patient: Lito Unger    Procedure Information       Date/Time: 10/16/24 0800    Scheduled providers: Rosy Handley MD MPH; Varsha Zamorano MD    Procedure: EGD    Location: [facility]            Relevant Problems   Cardiac   (+) Primary hypertension      GI   (+) Esophageal reflux      Musculoskeletal   (+) Facet degeneration of lumbar region   (+) Gout, arthropathy   (+) Lumbar degenerative disc disease   (+) Lumbar spondylosis       Clinical information reviewed:   Tobacco  Allergies  Meds   Med Hx  Surg Hx   Fam Hx  Soc Hx        NPO Detail:  NPO/Void Status  Date of Last Liquid: 10/15/24  Time of Last Liquid: 2200  Date of Last Solid: 10/15/24  Time of Last Solid: 1900  Last Intake Type: Clear fluids         Physical Exam    Airway  Mallampati: I  TM distance: >3 FB  Neck ROM: full     Cardiovascular   Rhythm: regular  Rate: normal     Dental - normal exam       Pulmonary   Breath sounds clear to auscultation     Abdominal            Anesthesia Plan    History of general anesthesia?: yes  History of complications of general anesthesia?: no    ASA 2     MAC     intravenous induction   Anesthetic plan and risks discussed with patient.  Use of blood products discussed with patient who consented to blood products.    Plan discussed with attending and CAA.

## 2024-10-17 DIAGNOSIS — R10.11 CHRONIC RIGHT UPPER QUADRANT PAIN: ICD-10-CM

## 2024-10-17 DIAGNOSIS — G89.29 CHRONIC RIGHT UPPER QUADRANT PAIN: ICD-10-CM

## 2024-10-17 DIAGNOSIS — R10.13 DYSPEPSIA: ICD-10-CM

## 2024-10-17 DIAGNOSIS — K28.9 ULCER JEJUNUM: ICD-10-CM

## 2024-10-17 DIAGNOSIS — Z98.84 STATUS POST BARIATRIC SURGERY: ICD-10-CM

## 2024-10-17 DIAGNOSIS — R10.13 EPIGASTRIC PAIN: ICD-10-CM

## 2024-10-17 DIAGNOSIS — K91.89 ANASTOMOTIC STRICTURE OF GASTROJEJUNOSTOMY: ICD-10-CM

## 2024-10-17 RX ORDER — SUCRALFATE 1 G/1
1 TABLET ORAL AS NEEDED
Qty: 120 TABLET | Refills: 3 | Status: SHIPPED | OUTPATIENT
Start: 2024-10-17 | End: 2025-02-14

## 2024-10-24 DIAGNOSIS — M51.360 DISCOGENIC LOW BACK PAIN: ICD-10-CM

## 2024-10-24 DIAGNOSIS — G89.29 OTHER CHRONIC PAIN: ICD-10-CM

## 2024-10-24 DIAGNOSIS — M47.816 FACET DEGENERATION OF LUMBAR REGION: ICD-10-CM

## 2024-10-25 RX ORDER — OXYCODONE AND ACETAMINOPHEN 5; 325 MG/1; MG/1
1-2 TABLET ORAL EVERY 6 HOURS PRN
Qty: 60 TABLET | Refills: 0 | Status: SHIPPED | OUTPATIENT
Start: 2024-10-25 | End: 2024-11-24

## 2024-10-29 ENCOUNTER — TELEPHONE (OUTPATIENT)
Dept: SURGERY | Facility: CLINIC | Age: 49
End: 2024-10-29
Payer: COMMERCIAL

## 2024-11-08 NOTE — PROGRESS NOTES
BARIATRIC SURGICAL WEIGHT LOSS   POST DIAGNOSTIC TESTING FOLLOW UP VISIT    CLINIC DATE:  11/13/24    NAME: Lito Unger 49 y.o.  MRN: [id number]    Date of Surgery: 2018   Surgical Procedure: Laparoscopic lois en y gastric bypass 09918  Extra Procedures: None      Initial weight: 359 lb  Pre-surgical weight: 317 lb  THIS VISIT WEIGHT / BMI:    Wt Readings from Last 1 Encounters:   10/16/24 99.8 kg (220 lb)      BMI Readings from Last 1 Encounters:   10/16/24 27.50 kg/m²     WEIGHT / BMI HX:    Wt Readings from Last 3 Encounters:   10/16/24 99.8 kg (220 lb)   09/11/24 99.8 kg (220 lb)   08/27/24 103 kg (226 lb)      BMI Readings from Last 3 Encounters:   10/16/24 27.50 kg/m²   09/11/24 27.50 kg/m²   08/27/24 28.25 kg/m²        TOTAL WEIGHT LOSS:  ***  LBS      PROVIDER LAST IMPRESSION VISIT NOTE 8/21/24:  Lito Unger is a 48 y.o. adult with 6 years post bypass overall continues to struggle with pain with eating.  We have dilated twice with last in June to 15 mm.   Notes a lot of reflux prior to surgery and 2 nexium bid hardly controlled his symptoms.  His acid went away after the bypass and now feels like taking a step back. Not having same symtopms with no pulmonary issues. We can make the antiacid medicine twice/day.  He notes his swallowing is better but conntiues to have issues.  Will plan for sequential dilations with steroi injections in hopes that the dilations will be more robust.  Patient is agreeable to the plan.  No ulcer causing activities notede from the patient side.   PLAN:  Continue the omeprazole in am and the pepcid at dinner time  Take the carafate 4 times/day.   We will do sequential stretching with steroid for 3 months in a row.       PRESENTING FOR Bariatric Surgical Weight Loss Post Diagnostic Testing Results Review FUV: is a 49 y.o. adult who is 6 yrs S/P:  rygb & has completed previously ordered EGD with dilation and botox.       TEST RESULTS:  EGD 9/11/24:  Impression  The esophagus  appeared normal.  The gastric pouch appeared normal.  Mild ulcerated mucosa in the gastrojejunal anastomosis; performed cold forceps biopsy  The jejunum appeared normal.  Stricture in the gastrojejunal anastomosis; dilated to 18 mm end size. Dilation caused mucosal tears, improved passage of the scope and improved lumen appearance  Injected botox  Stricture with length of less than 1 cm in the gastrojejunal anastomosis  Findings  The esophagus appeared normal.  The gastric pouch appeared normal.  Z-line  Mild, localized ulcerated mucosa in the gastrojejunal anastomosis; performed cold forceps biopsy  The jejunum appeared normal.  Benign-appearing stricture (traversable) in the gastrojejunal anastomosis; dilated with 8 mm long [employer] Discovery balloon dilator from 10 mm starting size to 18 mm end size. Dilation caused mucosal tears, improved passage of the scope and improved lumen appearance  Botox injection  Benign-appearing stricture (traversable) with length of less than 1 cm in the gastrojejunal anastomosis. Injected with kenalog in 4 quadrants  Recommendation  Follow up with me in clinic, due: 10/9/2024   Follow the pouch rules:   - Eat 5 small meals per day (3 meals and 2 snacks)  - Take in at least 60 g protein daily (try to get through lean meats, dairy, legumes)  - Eat 5 vegetables per day  - No sweets, fruits are fine.  Berries and citrus are lower glycemic index fruits  -Limit rice, bread, pasta and potatoes.  These should only be consumed after having your protein and vegetables  - Drink at least 60 oz fluid daily, (non caffeinated, no carbonated beverages, sugar free)  - Get 60 minutes of exercise daily    Most open it has been, scope passed easily as soon as I entered anastomosis  Ulcer improving  Continue omeprazole and carafate  Chew well and eat slowly            EGD 10/16/24:  Impression  The esophagus appeared normal.  Normal.  The gastric pouch and stomach appeared normal.  The gastrojejunostomy  appeared normal.  The gastric pouch appeared normal.  Dilated in the gastrojejunostomy with balloon dilator to 20 mm end size, injected with 5 mL of methylprednisolone. Dilation caused bleeding and improved passage of the scope; post-dilation bleeding was minimal  Findings  The esophagus appeared normal.  Normal  Z-line  The stomach and gastric pouch appeared normal.  The gastrojejunostomy appeared normal.  The gastric pouch appeared normal.  Dilated in the gastrojejunostomy (45 cm from the incisors) with 8 mm long boston balloon dilator from 18 mm starting size to 20 mm end size with step size of 1 mm, injected with 5 mL of methylprednisolone. Dilation caused bleeding and improved passage of the scope; post-dilation bleeding was minimal  Recommendation  Follow up with me in clinic, due: 11/13/2024   Follow the pouch rules:  - Eat 5 small meals per day (3 meals and 2 snacks)  - Take in at least 60 g protein daily (try to get through lean meats, dairy, legumes)  - Eat 5 vegetables per day  - No sweets, fruits are fine.  Berries and citrus are lower glycemic index fruits  -Limit rice, bread, pasta and potatoes.  These should only be consumed after having your protein and vegetables  - Drink at least 60 oz fluid daily, (non caffeinated, no carbonated beverages, sugar free)  - Get 60 minutes of exercise daily   Continue the PPI indefinitely  Use the carafate prn  Chew well and eat slowly              CURRENT SYMPTOMS:      Abdominal pain:  {Yes, No:31292}            Constipation: {Yes, No:42239}    Diarrhea: {Yes, No:50534}    Dumping Syndrome:  {Yes, No:82003}    Experiencing hunger: {Yes, No:78800}    Food Intolerances: {Yes, No:77114}    Nausea/vomiting: {Yes, No:49514}    Reflux:  {Yes, No:26245}    Regurgitation:  {Yes, No:34103}    Weight Regain: {Yes, No:96161}       DIET HISTORY:       Carbonated Beverages: {Yes, No:82679}          Caffeinated Beverages: {Yes, No:24029}    Time to eat meals: ***  Minutes     Fluid intake: *** oz/day      Protein Intake:  *** grams/day    Breakfast: ***    Lunch: ***    Dinner: ***    Snacks: ***    GERD - Health Related Quality of Life Questionnaire (GERD- HRQL)  {GERD QOL PPI USE:81012}    How bad is the heartburn? {GERDQOL:02687}  Heartburn when lying down? {GERDQOL:16907}  Heartburn when standing up? {GERDQOL:80939}  Heartburn after meals? {GERDQOL:17403}  Does heartburn change your diet? {GERDQOL:23595}  Does heartburn wake you from sleep? {GERDQOL:45893}    Do you have difficulty swallowing? {GERDQOL:57952}  Do you have pain with swallowing? {GERDQOL:97003}  If you take medication, does this affect your daily life? {GERDQOL:84880}    How bad is the regurgitation? {GERDQOL:66845}  Regurgitation when lying down? {GERDQOL:77319}  Regurgitation when standing up? {GERDQOL:25939}  Regurgitation after meals? {GERDQOL:25603}  Does regurgitation change your diet? {GERDQOL:90019}  Does regurgitation wake you from sleep? {GERDQOL:64201}    How satisfied are you with your present condition? {satisfaction GERD QOL:03709}    Total score (calculated by summing the individual scores of questions 1-15): ***   Greatest possible score 75 (worst symptoms).   Lowest possible score 0 (no symptoms).    Heartburn score (calculated by summing the individual scores of questions 1-6): ***   Worst heartburn symptoms: 30.   No heartburn symptoms: 0.   Score less than or equal to 12 with each individual question not exceeding 2 indicate heartburn elimination.    Regurgitation score (calculated by summing the individual scores of questions 10-15): ***   Worst regurgitation symptoms: 30.   No regurgitation symptoms: 0.   Score less than or equal to 12 with each individual question not exceeding 2 indicate regurgitation elimination.    EXERCISE:  {Yes, No:50240}     CURRENT MEDICATIONS:  Current Outpatient Medications   Medication Sig Dispense Refill    allopurinol (Zyloprim) 100 mg tablet Take 4 tablets (400 mg)  by mouth once daily. 360 tablet 3    cyclobenzaprine (Flexeril) 10 mg tablet Take 1 tablet (10 mg) by mouth 3 times a day as needed for muscle spasms. 90 tablet 1    famotidine (Pepcid) 40 mg tablet Take 1 tablet (40 mg) by mouth 2 times a day. 180 tablet 3    fluticasone (Flonase) 50 mcg/actuation nasal spray Administer 2 sprays into each nostril once daily. Shake gently. Before first use, prime pump. After use, clean tip and replace cap. 16 g 5    imiquimod (Aldara) 5 % cream Apply 1 packet topically 3 times a week. 12 packet 1    metaxalone (Skelaxin) 800 mg tablet Take 1 tablet (800 mg) by mouth 2 times a day. 30 tablet 3    naloxone (Narcan) 4 mg/0.1 mL nasal spray Administer 1 spray (4 mg) into affected nostril(s) if needed for opioid reversal. 2 each 0    omeprazole (PriLOSEC) 40 mg DR capsule Take 1 capsule (40 mg) by mouth 2 times a day before meals. 180 capsule 3    oxyCODONE-acetaminophen (Percocet) 5-325 mg tablet Take 1-2 tablets by mouth every 6 hours if needed for severe pain (7 - 10). 60 tablet 0    sucralfate (Carafate) 1 gram tablet Take 1 tablet (1 g) by mouth if needed (four times daily as needed before meals and at bedtime). Take 1 Tab by mouth on an empty stomach four times daily:  before meals and at bed time x 30 days.  May make into a slurry with small amount of water. 120 tablet 3     No current facility-administered medications for this visit.       PAST MEDICAL HISTORY:    Past Medical History:   Diagnosis Date    Abnormal weight loss 10/16/2018    Rapid weight loss    Acute bronchospasm 02/23/2018    Post-infection bronchospasm    Anesthesia of skin     Numbness, limb    Anxiety disorder, unspecified 08/30/2015    Anxiety and depression    Biomechanical lesion, unspecified 04/16/2015    Nonallopathic lesion of rib cage    Body mass index (BMI) 38.0-38.9, adult 07/05/2018    BMI 38.0-38.9,adult    Carpal tunnel syndrome, bilateral upper limbs 05/23/2017    Bilateral carpal tunnel syndrome     Chronic gout, unspecified, without tophus (tophi) 09/03/2019    Chronic gout    Cramp and spasm 09/08/2017    Muscle cramping    Dietary counseling and surveillance     Pre-bariatric surgery nutrition evaluation    GERD (gastroesophageal reflux disease)     Idiopathic gout, right hand 10/14/2019    Idiopathic gout of right hand, unspecified chronicity    Morbid (severe) obesity due to excess calories (Multi) 05/31/2017    Obesity, morbid, BMI 40.0-49.9    Other acute postprocedural pain     Post-op pain    Other intervertebral disc degeneration, lumbar region 04/19/2016    Other intervertebral disc degeneration, lumbar region    Other specified anxiety disorders 05/31/2017    Other specified anxiety disorders    Other symptoms and signs involving the genitourinary system 11/04/2016    Bladder pain    Pain in leg, unspecified 09/15/2013    Lower extremity pain    Personal history of other benign neoplasm 06/27/2019    History of other benign neoplasm    Personal history of other diseases of the circulatory system 09/26/2013    History of hypertension    Personal history of other diseases of the digestive system 10/08/2015    History of esophageal reflux    Personal history of other diseases of the digestive system 08/01/2018    History of umbilical hernia    Personal history of other diseases of the musculoskeletal system and connective tissue 12/19/2016    History of joint pain    Personal history of other diseases of the musculoskeletal system and connective tissue 09/01/2017    History of back pain    Personal history of other diseases of the musculoskeletal system and connective tissue 09/08/2017    History of muscle spasm    Personal history of other diseases of the nervous system and sense organs     History of sleep apnea    Personal history of other diseases of the respiratory system 01/07/2020    History of sinusitis    Personal history of other diseases of urinary system 11/04/2016    History of  hematuria    Personal history of other diseases of urinary system 08/02/2021    History of hematuria    Personal history of other endocrine, nutritional and metabolic disease 05/16/2017    History of hypokalemia    Personal history of other endocrine, nutritional and metabolic disease 04/16/2015    History of hypokalemia    Personal history of other endocrine, nutritional and metabolic disease     History of dehydration    Personal history of other mental and behavioral disorders     History of depression    Personal history of other specified conditions 12/07/2015    History of paresthesia    Personal history of other specified conditions 10/29/2014    History of weight gain    Personal history of other specified conditions 10/29/2015    History of headache    Personal history of other specified conditions     History of heartburn    Personal history of other specified conditions 02/23/2018    History of abdominal pain    Personal history of other specified conditions 03/11/2017    History of polyuria    Personal history of other specified conditions 11/04/2016    History of gross hematuria    Personal history of other specified conditions 07/21/2021    History of dysuria    Personal history of other specified conditions 07/21/2021    History of left flank pain    Personal history of other specified conditions 10/24/2016    History of dysuria    Personal history of other specified conditions 11/12/2013    History of breast lump    Personal history of other specified conditions 02/23/2018    History of abdominal pain    Personal history of urinary (tract) infections 09/26/2013    History of cystitis    Snoring     Snoring    Sprain of ligaments of lumbar spine, initial encounter 12/19/2016    Low back sprain    Strain of muscle, fascia and tendon at neck level, initial encounter 12/07/2018    Cervical strain    Unspecified disturbances of skin sensation 10/14/2019    Paresthesias/numbness        PAST SURGICAL  HISTORY:  Past Surgical History:   Procedure Laterality Date    APPENDECTOMY  09/17/2013    Laparoscopic Appendectomy    GASTRIC BYPASS  08/21/2018    Gastric Surgery For Morbid Obesity Bypass With Neymar-en-Y    KNEE CARTILAGE SURGERY Left     OTHER SURGICAL HISTORY  10/27/2014    Incisional Hernia Repair    SHOULDER SURGERY  10/27/2014    Shoulder Surgery    TONSILLECTOMY  10/27/2014    Tonsillectomy    UPPER GASTROINTESTINAL ENDOSCOPY N/A        FAMILY HISTORY:    No family history on file.     SOCIAL HISTORY:    Social History     Tobacco Use    Smoking status: Never    Smokeless tobacco: Never   Vaping Use    Vaping status: Never Used   Substance Use Topics    Alcohol use: Not Currently    Drug use: Never       ALLERGIES:    Allergies   Allergen Reactions    Ciprofloxacin Tinnitus    Levofloxacin Tinnitus    Nsaids (Non-Steroidal Anti-Inflammatory Drug) Other    Adhesive Tape-Silicones Rash     Clear/transparent tape only and some bandaides    Gloves, Latex With Aloe Vera Rash    Latex Rash       REVIEW OF SYSTEMS:  GENERAL: Negative for malaise, significant weight loss and fever  NECK: Negative for lumps, goiter, pain and significant neck swelling  RESPIRATORY: Negative for cough, wheezing or shortness of breath.  CARDIOVASCULAR: Negative for chest pain, leg swelling or palpitations.  GI: Negative for abdominal discomfort, blood in stools or black stools or change in bowel habits  : No history of dysuria, frequency or incontinence  MUSCULOSKELETAL: Negative for joint pain or swelling, back pain or muscle pain.  SKIN: Negative for lesions, rash, and itching.  PSYCH: Negative for sleep disturbance, mood disorder and recent psychosocial stressors.  ENDOCRINE: Negative for cold or heat intolerance, polyuria, polydipsia and goiter.    PHYSICAL EXAM  There were no vitals taken for this visit.  General appearance: obese  Skin: warm, no erythema or rashes  Lungs: clear to percussion and auscultation  Heart: regular  rhythm and S1, S2 normal  Abdomen: ***  Extremities: Normal exam of the extremities. No swelling or pain.    IMPRESSION:  Lito Unger is a 49 y.o. adult with ***    PLAN:  ***    The risks of the procedure including bleeding, infection, injury to neighboring organ, prolonged hospitalization, need for further procedure and death have been explained to the patient and Lito Unger has expressed understanding and acceptance of them. Consent has been signed.    *** minutes spent with patient on face-to-face interaction, history/documentation, education, and coordination of care.    Dr. Rosy Handley M.D., MPH  Director of Bariatric & Minimally Invasive Surgery  [place] [address]  T:  [phone]  F:  [phone]     Shelby Freedman, RN Assistant Nurse Manager, Care Coordinator Patient Nursing Contact  T: [phone] F: [phone]

## 2024-11-13 ENCOUNTER — APPOINTMENT (OUTPATIENT)
Dept: SURGERY | Facility: CLINIC | Age: 49
End: 2024-11-13
Payer: COMMERCIAL

## 2024-11-26 ENCOUNTER — APPOINTMENT (OUTPATIENT)
Dept: PRIMARY CARE | Facility: CLINIC | Age: 49
End: 2024-11-26
Payer: COMMERCIAL

## 2024-11-26 VITALS
HEART RATE: 84 BPM | DIASTOLIC BLOOD PRESSURE: 80 MMHG | BODY MASS INDEX: 29 KG/M2 | OXYGEN SATURATION: 97 % | WEIGHT: 232 LBS | SYSTOLIC BLOOD PRESSURE: 136 MMHG

## 2024-11-26 DIAGNOSIS — M47.816 FACET DEGENERATION OF LUMBAR REGION: ICD-10-CM

## 2024-11-26 DIAGNOSIS — G89.29 OTHER CHRONIC PAIN: ICD-10-CM

## 2024-11-26 DIAGNOSIS — M51.369 LUMBAR DEGENERATIVE DISC DISEASE: ICD-10-CM

## 2024-11-26 DIAGNOSIS — M51.360 DISCOGENIC LOW BACK PAIN: Primary | ICD-10-CM

## 2024-11-26 DIAGNOSIS — E66.3 OVERWEIGHT WITH BODY MASS INDEX (BMI) OF 28 TO 28.9 IN ADULT: ICD-10-CM

## 2024-11-26 DIAGNOSIS — Z23 FLU VACCINE NEED: ICD-10-CM

## 2024-11-26 DIAGNOSIS — M10.9 GOUT, ARTHROPATHY: ICD-10-CM

## 2024-11-26 DIAGNOSIS — K21.00 GASTROESOPHAGEAL REFLUX DISEASE WITH ESOPHAGITIS WITHOUT HEMORRHAGE: ICD-10-CM

## 2024-11-26 PROCEDURE — 3075F SYST BP GE 130 - 139MM HG: CPT | Performed by: FAMILY MEDICINE

## 2024-11-26 PROCEDURE — 90471 IMMUNIZATION ADMIN: CPT | Performed by: FAMILY MEDICINE

## 2024-11-26 PROCEDURE — 90656 IIV3 VACC NO PRSV 0.5 ML IM: CPT | Performed by: FAMILY MEDICINE

## 2024-11-26 PROCEDURE — 99214 OFFICE O/P EST MOD 30 MIN: CPT | Performed by: FAMILY MEDICINE

## 2024-11-26 PROCEDURE — 3079F DIAST BP 80-89 MM HG: CPT | Performed by: FAMILY MEDICINE

## 2024-11-26 PROCEDURE — 1036F TOBACCO NON-USER: CPT | Performed by: FAMILY MEDICINE

## 2024-11-26 RX ORDER — VONOPRAZAN FUMARATE 26.72 MG/1
20 TABLET ORAL DAILY
Qty: 20 TABLET | Refills: 0 | COMMUNITY
Start: 2024-11-26

## 2024-11-26 RX ORDER — CYCLOBENZAPRINE HCL 10 MG
10 TABLET ORAL 3 TIMES DAILY PRN
Qty: 90 TABLET | Refills: 1 | Status: SHIPPED | OUTPATIENT
Start: 2024-11-26

## 2024-11-26 RX ORDER — OXYCODONE AND ACETAMINOPHEN 5; 325 MG/1; MG/1
1-2 TABLET ORAL EVERY 6 HOURS PRN
Qty: 60 TABLET | Refills: 0 | Status: SHIPPED | OUTPATIENT
Start: 2024-11-26 | End: 2024-12-26

## 2024-11-26 ASSESSMENT — PATIENT HEALTH QUESTIONNAIRE - PHQ9
2. FEELING DOWN, DEPRESSED OR HOPELESS: SEVERAL DAYS
3. TROUBLE FALLING OR STAYING ASLEEP OR SLEEPING TOO MUCH: SEVERAL DAYS
4. FEELING TIRED OR HAVING LITTLE ENERGY: NOT AT ALL
SUM OF ALL RESPONSES TO PHQ QUESTIONS 1-9: 4
6. FEELING BAD ABOUT YOURSELF - OR THAT YOU ARE A FAILURE OR HAVE LET YOURSELF OR YOUR FAMILY DOWN: SEVERAL DAYS
8. MOVING OR SPEAKING SO SLOWLY THAT OTHER PEOPLE COULD HAVE NOTICED. OR THE OPPOSITE, BEING SO FIGETY OR RESTLESS THAT YOU HAVE BEEN MOVING AROUND A LOT MORE THAN USUAL: NOT AT ALL
SUM OF ALL RESPONSES TO PHQ9 QUESTIONS 1 AND 2: 2
1. LITTLE INTEREST OR PLEASURE IN DOING THINGS: SEVERAL DAYS
7. TROUBLE CONCENTRATING ON THINGS, SUCH AS READING THE NEWSPAPER OR WATCHING TELEVISION: NOT AT ALL
9. THOUGHTS THAT YOU WOULD BE BETTER OFF DEAD, OR OF HURTING YOURSELF: NOT AT ALL
5. POOR APPETITE OR OVEREATING: NOT AT ALL

## 2024-11-26 ASSESSMENT — ANXIETY QUESTIONNAIRES
7. FEELING AFRAID AS IF SOMETHING AWFUL MIGHT HAPPEN: SEVERAL DAYS
GAD7 TOTAL SCORE: 4
IF YOU CHECKED OFF ANY PROBLEMS ON THIS QUESTIONNAIRE, HOW DIFFICULT HAVE THESE PROBLEMS MADE IT FOR YOU TO DO YOUR WORK, TAKE CARE OF THINGS AT HOME, OR GET ALONG WITH OTHER PEOPLE: SOMEWHAT DIFFICULT
3. WORRYING TOO MUCH ABOUT DIFFERENT THINGS: MORE THAN HALF THE DAYS
1. FEELING NERVOUS, ANXIOUS, OR ON EDGE: NOT AT ALL
6. BECOMING EASILY ANNOYED OR IRRITABLE: NOT AT ALL
5. BEING SO RESTLESS THAT IT IS HARD TO SIT STILL: NOT AT ALL
4. TROUBLE RELAXING: NOT AT ALL
2. NOT BEING ABLE TO STOP OR CONTROL WORRYING: SEVERAL DAYS

## 2024-11-26 ASSESSMENT — LIFESTYLE VARIABLES: TOTAL SCORE: 1

## 2024-11-26 NOTE — PROGRESS NOTES
Subjective   Patient ID: Lito Unger is a 49 y.o. male who presents for Follow-up.    HPI    CHRONIC LOW BACK PAIN:  Has been seeing his son who is a chiropractor which seems to be helping  Using traction during the treatments   Limited NSAIDs due to h/o gastric ulcers  Sciatic symptoms off and on the last week or so  He has had evaluation with [facility] - Dr. Lynne for possible nerve ablation but has not scheduled    In review of chronic low back pain history:  Has had chronic low back pain for many years  Had been to orthopedics and multiple rounds of PT as well as chiropractic care     MRI 2015 - herniated disc at L5-S1  Has had multiple courses of formal PT  Did see Dr. Radha Young for pain management options      Did have injections with Dr. 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 I have considered the risks of abuse, dependence, addiction and diversion    Is the patient prescribed a combination of a benzodiazepine and opioid?  No    Last Urine Drug Screen / ordered today: UTD  Recent Results (from the past 8760 hours)   Opiate Confirmation, Urine    Collection Time: 01/26/24 10:48 AM   Result Value Ref Range    6-Acetylmorphine <25 <25 ng/mL    Codeine <50 <50 ng/mL    Hydrocodone <25 <25 ng/mL    Hydromorphone <25 <25 ng/mL    Morphine  <50 <50 ng/mL    Norhydrocodone <25 <25 ng/mL    Noroxycodone <25 <25 ng/mL    Oxycodone <25 <25 ng/mL    Oxymorphone <25 <25 ng/mL   Drug Screen, Urine With Reflex to Confirmation    Collection Time:  01/26/24 10:48 AM   Result Value Ref Range    Amphetamine Screen, Urine Presumptive Negative Presumptive Negative    Barbiturate Screen, Urine Presumptive Negative Presumptive Negative    Benzodiazepines Screen, Urine Presumptive Negative Presumptive Negative    Cannabinoid Screen, Urine Presumptive Negative Presumptive Negative    Cocaine Metabolite Screen, Urine Presumptive Negative Presumptive Negative    Fentanyl Screen, Urine Presumptive Negative Presumptive Negative    Opiate Screen, Urine Presumptive Negative Presumptive Negative    Oxycodone Screen, Urine Presumptive Negative Presumptive Negative    PCP Screen, Urine Presumptive Negative Presumptive Negative     Results are as expected.   Clinical rationale for not completing a Urine Drug Screen:     Controlled Substance Agreement:  Date of the Last Agreement: 1/26/2024  Reviewed Controlled Substance Agreement including but not limited to the benefits, risks, and alternatives to treatment with a Controlled Substance medication(s).  Reviewed Controlled Substance Agreement including but not limited to the benefits, risk, and alternatives to treatment with a Controlled Substance medication(s)     Opioids:  What is the patient's goal of therapy? Maintain adequate levels of pain to carry out daily activities  Is this being achieved with current treatment? yes    I have calculated the patient's Morphine Dose Equivalent (MED):   I have considered referral to Pain Management and/or a specialist, and do not feel it is necessary at this time.    I feel that it is clinically indicated to continue this current medication regimen after consideration of alternative therapies, and other non-opioid treatment.    Opioid Risk Screening:  Family History of Substance Abuse  Alcohol: 0 (11/26/2024  9:00 AM)  Illegal Drugs: 0 (11/26/2024  9:00 AM)  Prescription Drugs: 0 (11/26/2024  9:00 AM)    Personal History of Substance Abuse  Alcohol: 0 (11/26/2024  9:00 AM)  Illegal Drugs:  0 (11/26/2024  9:00 AM)  Prescription Drugs: 0 (11/26/2024  9:00 AM)    Patient Age (16-45)  Age (16-45): 0 (11/26/2024  9:00 AM)    History of Preadolescent Sexual Abuse  History of Preadolescent Sexual Abuse: .0 (11/26/2024  9:00 AM)    Psychological Disease  Attention Deficit Disorder, Obsessive Compulsive Disorder, Bipolar, Schizophrenia: 0 (11/26/2024  9:00 AM)  Depression: 1 (11/26/2024  9:00 AM)    Total Score  Total Score: 1 (11/26/2024  9:00 AM)    Total Score Risk Category  TOTAL SCORE CATEGORY: Low Risk (0-3) (11/26/2024  9:00 AM)        Pain Assessment:  Analgesia  What was your pain level on average during the past week?: 6  What was your pain level at its worst during the past week?: 9  What percentage of your pain has been relieved during the past week?: 50 %  Is the amount of pain relief you are now obtaining from your current pain relievers enough to make a real difference in your life?: Y  Query to Clinician: Is the patient's pain relief clinically significant?: Yes    Activities of Daily Living  Physical Functioning: Same  Family Relationships: Same  Social Relationships: Same  Mood: Same  Sleep Patterns: Same  Overall Functioning: Same    Adverse Events  Is patient experiencing any side effects from current pain relievers?: N  Patient's Overall Severity of Side Effects: None      Assessment  Is your overall impression that this patient is benefiting from opioid therapy?: Yes  Specific Analgesic Plan: Continue present regimen         GOUT: tolerating Allopurinol    GERD: had recent EGD with Dr. Handley 10/16/2024 - Dilation performed as well at gastrojejunostomy    Review of Systems    Review of Systems negative except as noted in HPI and Chief complaint.     Objective   Patient Health Questionnaire-9 Score: 4     YESENIA-7 Total Score: 4     VITALS:  /80 (BP Location: Left arm, Patient Position: Sitting, BP Cuff Size: Adult)   Pulse 84   Wt 105 kg (232 lb)   SpO2 97%   BMI 29.00 kg/m²       Physical Exam  Constitutional:       General: He is not in acute distress.     Appearance: Normal appearance. He is not ill-appearing.   HENT:      Head: Normocephalic and atraumatic.   Neck:      Vascular: No carotid bruit.   Cardiovascular:      Rate and Rhythm: Normal rate and regular rhythm.      Pulses: Normal pulses.      Heart sounds: Normal heart sounds. No murmur heard.     No gallop.   Pulmonary:      Effort: Pulmonary effort is normal.      Breath sounds: Normal breath sounds. No wheezing, rhonchi or rales.   Musculoskeletal:         General: Normal range of motion.      Cervical back: Normal range of motion and neck supple. No rigidity or tenderness.   Lymphadenopathy:      Cervical: No cervical adenopathy.   Skin:     General: Skin is warm and dry.   Neurological:      Mental Status: He is alert.   Psychiatric:         Mood and Affect: Mood normal.         Behavior: Behavior normal.         Assessment/Plan   Problem List Items Addressed This Visit       Chronic pain     Unchanged but stable on current regimen.  Encouraging tentative follow up with pain management for possible injections with lumbar radiculopathy persisting.  The OARRS website was checked and validated.  I have personally reviewed the OARRS report for this patient.  This report has been scanned into the electronic medical record.  I have considered the risks of abuse,, dependence, addiction and diversion.  I believe that it is clinically appropriate for this patient to be prescribed this medication.  The patient has been told not to increase dose or refill sooner than indicated.  Any variation from this practice will results in my denying further prescriptions and possible discharge from care.  Follow up 3 months.         Relevant Medications    oxyCODONE-acetaminophen (Percocet) 5-325 mg tablet    Esophageal reflux     Trial of Voquezna in place of Omeprazole         Relevant Medications    vonoprazan (Voquezna) 20 mg tablet     Facet degeneration of lumbar region    Relevant Medications    oxyCODONE-acetaminophen (Percocet) 5-325 mg tablet    Other Relevant Orders    Referral to Orthopaedic Surgery    XR lumbar spine 2-3 views    Referral to Physical Therapy    Discogenic low back pain - Primary     Stable on current regimen - encouraging to follow up with pain management to discuss nerve ablation or other treatment options for chronic pain.    The OARRS website was checked and validated.  I have personally reviewed the OARRS report for this patient.  This report has been scanned into the electronic medical record.  I have considered the risks of abuse,, dependence, addiction and diversion.  I believe that it is clinically appropriate for this patient to be prescribed this medication.  The patient has been told not to increase dose or refill sooner than indicated.  Any variation from this practice will results in my denying further prescriptions and possible discharge from care.   Follow up 3 months.         Relevant Medications    oxyCODONE-acetaminophen (Percocet) 5-325 mg tablet    cyclobenzaprine (Flexeril) 10 mg tablet    Other Relevant Orders    XR lumbar spine 2-3 views    Referral to Physical Therapy    Lumbar degenerative disc disease    Relevant Medications    cyclobenzaprine (Flexeril) 10 mg tablet    Gout, arthropathy    Overweight with body mass index (BMI) of 28 to 28.9 in adult     Stable - continues to work at weight loss and to maintain weight loss.            Other Visit Diagnoses       Flu vaccine need        Relevant Orders    Flu vaccine, trivalent, preservative free, age 6 months and greater (Fluarix/Fluzone/Flulaval) (Completed)            FOLLOW UP 3 MONTHS, SOONER WITH ANY PROBLEMS OR CONCERNS.

## 2024-11-27 ENCOUNTER — APPOINTMENT (OUTPATIENT)
Dept: SURGERY | Facility: CLINIC | Age: 49
End: 2024-11-27
Payer: COMMERCIAL